# Patient Record
Sex: FEMALE | Race: WHITE
[De-identification: names, ages, dates, MRNs, and addresses within clinical notes are randomized per-mention and may not be internally consistent; named-entity substitution may affect disease eponyms.]

---

## 2017-12-26 ENCOUNTER — HOSPITAL ENCOUNTER (EMERGENCY)
Dept: HOSPITAL 11 - JP.ED | Age: 22
Discharge: HOME | End: 2017-12-26
Payer: MEDICARE

## 2017-12-26 VITALS — DIASTOLIC BLOOD PRESSURE: 97 MMHG | SYSTOLIC BLOOD PRESSURE: 143 MMHG

## 2017-12-26 DIAGNOSIS — F32.9: ICD-10-CM

## 2017-12-26 DIAGNOSIS — F41.9: Primary | ICD-10-CM

## 2017-12-26 DIAGNOSIS — F17.210: ICD-10-CM

## 2017-12-26 DIAGNOSIS — F20.9: ICD-10-CM

## 2017-12-26 DIAGNOSIS — T43.595A: ICD-10-CM

## 2017-12-26 DIAGNOSIS — I10: ICD-10-CM

## 2017-12-26 DIAGNOSIS — Z79.899: ICD-10-CM

## 2017-12-26 NOTE — EDM.PDOCBH
ED HPI GENERAL MEDICAL PROBLEM





- General


Chief Complaint: Behavioral/Psych


Stated Complaint: EVAL


Time Seen by Provider: 12/26/17 20:05


Source of Information: Reports: Patient


History Limitations: Reports: No Limitations





- History of Present Illness


INITIAL COMMENTS - FREE TEXT/NARRATIVE: 





This young woman came in complaining of anxiety. She felt like somebody might 

be trying to get her. She takes Zyprexa but is only taking 10 mg per day. She 

said she was afraid to increase it because it might cause side effects. She 

takes Cogentin it's prescribed 1 mg twice daily but she only takes it once 

daily. Note that she is not having any type of EPS symptoms. There is no 

suicidal or homicidal ideation


  ** Denies


Pain Score (Numeric/FACES): 0





- Related Data


 Allergies











Allergy/AdvReac Type Severity Reaction Status Date / Time


 


No Known Allergies Allergy   Verified 01/30/17 02:46











Home Meds: 


 Home Meds





Benztropine [Cogentin] 1 tab PO BID 09/14/16 [History]


Cholecalciferol (Vitamin D3) [Vitamin D3] 1 tab PO DAILY 09/14/16 [History]


Sertraline [Zoloft] 100 tab PO DAILY 09/14/16 [History]


OLANZapine [Zyprexa] 10 mg PO BEDTIME 12/26/17 [History]











Past Medical History


Cardiovascular History: Reports: Hypertension


Psychiatric History: Reports: Addiction, Anxiety, Depression, Panic Attack, 

Psych Hospitalization(s), Schizophrenia





- Infectious Disease History


Infectious Disease History: Reports: Chicken Pox





- Past Surgical History


HEENT Surgical History: Reports: Oral Surgery





Social & Family History





- Tobacco Use


Smoking Status *Q: Current Every Day Smoker


Years of Tobacco use: 10


Packs/Tins Daily: 0.5


Used Tobacco, but Quit: No


Second Hand Smoke Exposure: Yes





- Caffeine Use


Caffeine Use: Reports: Soda





- Alcohol Use


Days Per Week of Alcohol Use: 0


Number of Drinks Per Day: 2


Total Drinks Per Week: 0





- Recreational Drug Use


Recreational Drug Use: No


Drug Use in Last 12 Months: Yes


Recreational Drug Type: Reports: Amphetamines (Speed), Marijuana/Hashish, 

Methamphetamine


Recreational Drug Use Frequency: Weekly


Recreational Drug Last Use: last night





ED ROS GENERAL





- Review of Systems


Review Of Systems: ROS reveals no pertinent complaints other than HPI.





ED EXAM, BEHAVIORAL HEALTH





- Physical Exam


Exam: See Below


Exam Limited By: No Limitations


General Appearance: Alert, WD/WN, No Apparent Distress


Eye Exam: Bilateral Eye: Normal Inspection


Respiratory/Chest: Lungs Clear


Cardiovascular: Regular Rate, Rhythm


Neurological: Alert, Normal Mood/Affect, CN II-XII Intact, Normal Cognition, 

Normal Gait, No Motor/Sensory Deficits


Psychiatric: Alert, Normal Affect, Normal Cognition, Normal Mood, Other (This 

patient does not appear anxious)





COURSE, BEHAVIORAL HEALTH COMP





- Course


Vital Signs: 


 Last Vital Signs











Temp  36.6 C   12/26/17 20:19


 


Pulse  77   12/26/17 20:19


 


Resp  16   12/26/17 20:19


 


BP  143/97 H  12/26/17 20:19


 


Pulse Ox  94 L  12/26/17 20:19














Departure





- Departure


Time of Disposition: 20:43


Disposition: Home, Self-Care 01


Condition: Fair


Clinical Impression: 


 Anxiety, Medication side effects








- Discharge Information


Instructions:  Panic Attacks


Referrals: 


Himanshu Luz MD [Primary Care Provider] - 


Forms:  ED Department Discharge


Additional Instructions: 


You may increase Zyprexa to 15 mg daily or 1-1/2 tablets. The Cogentin can be 

increased to 1 mg twice daily as needed for side effects. 1 mg twice daily used 

to be the standard dose for counting the side effects of the older generation 

schizophrenia medications. The higher dose of Cogentin may give you a dry 

mouth. Follow-up with a psychiatrist as soon as possible or contact Dr. Luz.

## 2018-08-31 ENCOUNTER — HOSPITAL ENCOUNTER (EMERGENCY)
Dept: HOSPITAL 11 - JP.ED | Age: 23
LOS: 1 days | Discharge: HOME | End: 2018-09-01
Payer: MEDICARE

## 2018-08-31 VITALS — DIASTOLIC BLOOD PRESSURE: 94 MMHG | SYSTOLIC BLOOD PRESSURE: 147 MMHG

## 2018-08-31 DIAGNOSIS — F41.9: Primary | ICD-10-CM

## 2018-08-31 DIAGNOSIS — Z79.899: ICD-10-CM

## 2018-08-31 DIAGNOSIS — F17.210: ICD-10-CM

## 2018-08-31 DIAGNOSIS — I10: ICD-10-CM

## 2018-08-31 PROCEDURE — 85027 COMPLETE CBC AUTOMATED: CPT

## 2018-08-31 PROCEDURE — 36415 COLL VENOUS BLD VENIPUNCTURE: CPT

## 2018-08-31 PROCEDURE — 81025 URINE PREGNANCY TEST: CPT

## 2018-08-31 PROCEDURE — 99283 EMERGENCY DEPT VISIT LOW MDM: CPT

## 2018-08-31 PROCEDURE — 80305 DRUG TEST PRSMV DIR OPT OBS: CPT

## 2018-08-31 PROCEDURE — 81001 URINALYSIS AUTO W/SCOPE: CPT

## 2018-08-31 NOTE — EDM.PDOCBH
ED HPI GENERAL MEDICAL PROBLEM





- General


Chief Complaint: Behavioral/Psych


Stated Complaint: EVAL


Time Seen by Provider: 08/31/18 21:20


Source of Information: Reports: Patient, Old Records, RN


History Limitations: Reports: No Limitations





- History of Present Illness


INITIAL COMMENTS - FREE TEXT/NARRATIVE: 





24 yo female with known mental health issues presents with a family member due 

to being "on the verge of freaking out". Was transferred from here to [facility] about 3 weeks ago. Is not suicidal or homicidal currently. No 

recent illness. 


Onset: Today


Onset Date: 08/31/18


Duration: Hour(s):


Location: Reports: Generalized


Quality: Reports: Burning (throat), Other (anxious)


Severity: Moderate


Improves with: Reports: None


Worsens with: Reports: Other (uncertain)


Context: Reports: Other (Hx of mental health problems)


Associated Symptoms: Reports: Other (throat mild burning, post-nasal drip)


Treatments PTA: Reports: Other (see below) (usual meds only)





- Related Data


 Allergies











Allergy/AdvReac Type Severity Reaction Status Date / Time


 


No Known Allergies Allergy   Verified 08/05/18 20:25











Home Meds: 


 Home Meds





Benztropine [Cogentin] 1 tab PO BID 09/14/16 [History]


OLANZapine [Olanzapine] 1 tab PO DAILY 08/31/18 [History]











Past Medical History


Cardiovascular History: Reports: Hypertension


Psychiatric History: Reports: Addiction, Anxiety, Depression, Panic Attack, 

Psych Hospitalization(s), Schizophrenia





- Infectious Disease History


Infectious Disease History: Reports: Chicken Pox





- Past Surgical History


HEENT Surgical History: Reports: Oral Surgery





Social & Family History





- Family History


Family Medical History: Noncontributory





- Tobacco Use


Smoking Status *Q: Current Every Day Smoker


Years of Tobacco use: 10


Packs/Tins Daily: 0.5





- Caffeine Use


Caffeine Use: Reports: Coffee, Energy Drinks, Soda





- Recreational Drug Use


Recreational Drug Use: No





ED ROS GENERAL





- Review of Systems


Review Of Systems: See Below


Constitutional: Reports: No Symptoms


HEENT: Reports: Throat Pain (mild burning)


Respiratory: Reports: No Symptoms


Cardiovascular: Reports: No Symptoms


GI/Abdominal: Reports: No Symptoms


: Reports: No Symptoms


Musculoskeletal: Reports: No Symptoms


Skin: Reports: No Symptoms


Neurological: Reports: No Symptoms


Psychiatric: Reports: Anxiety





ED EXAM, BEHAVIORAL HEALTH





- Physical Exam


Exam: See Below


Exam Limited By: No Limitations


General Appearance: Alert, WD/WN, No Apparent Distress


Eye Exam: Bilateral Eye: Normal Inspection


Ears: Normal External Exam, Normal Canal, Hearing Grossly Normal, Normal TMs


Nose: Normal Inspection, Normal Mucosa, No Blood


Throat/Mouth: Normal Inspection, Normal Lips, Normal Oropharynx, Normal Voice, 

No Airway Compromise


Head: Atraumatic, Normocephalic


Neck: Normal Inspection, Supple, Non-Tender


Respiratory/Chest: No Respiratory Distress, Lungs Clear, Normal Breath Sounds, 

No Accessory Muscle Use


Cardiovascular: Regular Rate, Rhythm, No Edema


GI/Abdominal: Normal Bowel Sounds, Soft, Non-Tender, No Distention


Extremities: Normal Inspection, Normal Range of Motion, Non-Tender, No Pedal 

Edema


Neurological: Alert, Normal Mood/Affect, CN II-XII Intact, Normal Cognition, 

Normal Reflexes, No Motor/Sensory Deficits, Oriented x 3


Psychiatric: Alert, Normal Affect, Normal Cognition, Normal Mood, Oriented


Skin Exam: Warm, Dry, Intact, Normal color, No rash





COURSE, BEHAVIORAL HEALTH COMP





- Course


Vital Signs: 


 Last Vital Signs











Temp  36.7 C   08/31/18 21:07


 


Pulse  100   08/31/18 21:07


 


Resp  16   08/31/18 21:07


 


BP  147/94 H  08/31/18 21:07


 


Pulse Ox  98   08/31/18 21:07











Orders, Labs, Meds: 


 Active Orders 24 hr











 Category Date Time Status


 


 DRUG SCREEN, URINE [URCHEM] Stat Lab  08/31/18 21:20 Ordered


 


 HCG QUALITATIVE,URINE [URCHEM] Stat Lab  08/31/18 21:20 Ordered


 


 UA W/MICROSCOPIC [URIN] Stat Lab  08/31/18 21:20 Ordered








 Laboratory Tests











  08/31/18 08/31/18 08/31/18 Range/Units





  21:20 21:20 21:20 


 


WBC     (4.5-11.0)  K/uL


 


RBC     (3.30-5.50)  M/uL


 


Hgb     (12.0-15.0)  g/dL


 


Hct     (36.0-48.0)  %


 


MCV     (80-98)  fL


 


MCH     (27-31)  pg


 


MCHC     (32-36)  %


 


Plt Count     (150-400)  K/uL


 


Urine Color  Yellow    


 


Urine Appearance  Clear    


 


Urine pH  6.5    (4.5-8.0)  


 


Ur Specific Gravity  1.015    (1.008-1.030)  


 


Urine Protein  Negative    (NEGATIVE)  mg/dL


 


Urine Glucose (UA)  Normal    (NEGATIVE)  mg/dL


 


Urine Ketones  Negative    (NEGATIVE)  mg/dL


 


Urine Occult Blood  Negative    (NEGATIVE)  


 


Urine Nitrite  Negative    (NEGATIVE)  


 


Urine Bilirubin  Negative    (NEGATIVE)  


 


Urine Urobilinogen  Normal    (NORMAL)  mg/dL


 


Ur Leukocyte Esterase  Negative    (NEGATIVE)  


 


Urine RBC  0-5    (0-5)  


 


Urine WBC  0-5    (0-5)  


 


Ur Epithelial Cells  Many    


 


Amorphous Sediment  Many    


 


Urine Bacteria  Few    


 


Urine Mucus  Not seen    


 


Urine HCG, Qual    Negative  


 


Urine Opiates Screen   Negative   (NEGATIVE)  


 


Ur Oxycodone Screen   Negative   (NEGATIVE)  


 


Urine Methadone Screen   Negative   (NEGATIVE)  


 


Ur Propoxyphene Screen   Negative   (NEGATIVE)  


 


Ur Barbiturates Screen   Negative   (NEGATIVE)  


 


Ur Tricyclics Screen   Negative   (NEGATIVE)  


 


Ur Phencyclidine Scrn   Negative   (NEGATIVE)  


 


Ur Amphetamine Screen   Negative   (NEGATIVE)  


 


U Methamphetamines Scrn   Negative   (NEGATIVE)  


 


Urine MDMA Screen   Negative   (NEGATIVE)  


 


U Benzodiazepines Scrn   Negative   (NEGATIVE)  


 


U Cocaine Metab Screen   Negative   (NEGATIVE)  


 


U Marijuana (THC) Screen   Presumptive positive H   (NEGATIVE)  














  08/31/18 Range/Units





  21:35 


 


WBC  7.7  (4.5-11.0)  K/uL


 


RBC  4.82  (3.30-5.50)  M/uL


 


Hgb  14.5  (12.0-15.0)  g/dL


 


Hct  43.0  (36.0-48.0)  %


 


MCV  89  (80-98)  fL


 


MCH  30  (27-31)  pg


 


MCHC  34  (32-36)  %


 


Plt Count  166  (150-400)  K/uL


 


Urine Color   


 


Urine Appearance   


 


Urine pH   (4.5-8.0)  


 


Ur Specific Gravity   (1.008-1.030)  


 


Urine Protein   (NEGATIVE)  mg/dL


 


Urine Glucose (UA)   (NEGATIVE)  mg/dL


 


Urine Ketones   (NEGATIVE)  mg/dL


 


Urine Occult Blood   (NEGATIVE)  


 


Urine Nitrite   (NEGATIVE)  


 


Urine Bilirubin   (NEGATIVE)  


 


Urine Urobilinogen   (NORMAL)  mg/dL


 


Ur Leukocyte Esterase   (NEGATIVE)  


 


Urine RBC   (0-5)  


 


Urine WBC   (0-5)  


 


Ur Epithelial Cells   


 


Amorphous Sediment   


 


Urine Bacteria   


 


Urine Mucus   


 


Urine HCG, Qual   


 


Urine Opiates Screen   (NEGATIVE)  


 


Ur Oxycodone Screen   (NEGATIVE)  


 


Urine Methadone Screen   (NEGATIVE)  


 


Ur Propoxyphene Screen   (NEGATIVE)  


 


Ur Barbiturates Screen   (NEGATIVE)  


 


Ur Tricyclics Screen   (NEGATIVE)  


 


Ur Phencyclidine Scrn   (NEGATIVE)  


 


Ur Amphetamine Screen   (NEGATIVE)  


 


U Methamphetamines Scrn   (NEGATIVE)  


 


Urine MDMA Screen   (NEGATIVE)  


 


U Benzodiazepines Scrn   (NEGATIVE)  


 


U Cocaine Metab Screen   (NEGATIVE)  


 


U Marijuana (THC) Screen   (NEGATIVE)  








Medications














Discontinued Medications














Generic Name Dose Route Start Last Admin





  Trade Name Raymond  PRN Reason Stop Dose Admin


 


Al Hydroxide/Mg Hydroxide  30 ml  08/31/18 21:24  08/31/18 21:33





  Mag-Al Plus  PO  08/31/18 21:25  30 ml





  ONETIME ONE   Administration





     





     





     





     


 


Lorazepam  1 mg  08/31/18 21:24  08/31/18 22:01





  Ativan  PO  08/31/18 21:25  Not Given





  ONETIME ONE   





     





     





     





     











Re-Assessment/Re-Exam: 





After several hours in the ER Maria A is feeling better, says she now thinks she 

was having a mild panic attack that has passed. Mother is OK with taking her 

home. Will follow up with her psych provider in [place] on Tues. 





Departure





- Departure


Time of Disposition: 03:43


Disposition: Home, Self-Care 01


Condition: Good


Clinical Impression: 


 Anxiety








- Discharge Information


*PRESCRIPTION DRUG MONITORING PROGRAM REVIEWED*: Not Applicable


*COPY OF PRESCRIPTION DRUG MONITORING REPORT IN PATIENT ARPAN: Not Applicable


Referrals: 


PCP,None [Primary Care Provider] - 


Forms:  ED Department Discharge


Additional Instructions: 


Continue your current meds. F/U with your mental health provider early next 

week if possible. 





- My Orders


Last 24 Hours: 


My Active Orders





08/31/18 21:20


DRUG SCREEN, URINE [URCHEM] Stat 


HCG QUALITATIVE,URINE [URCHEM] Stat 


UA W/MICROSCOPIC [URIN] Stat 














- Assessment/Plan


Last 24 Hours: 


My Active Orders





08/31/18 21:20


DRUG SCREEN, URINE [URCHEM] Stat 


HCG QUALITATIVE,URINE [URCHEM] Stat 


UA W/MICROSCOPIC [URIN] Stat

## 2018-10-03 ENCOUNTER — HOSPITAL ENCOUNTER (EMERGENCY)
Dept: HOSPITAL 11 - JP.ED | Age: 23
Discharge: HOME | End: 2018-10-03
Payer: MEDICARE

## 2018-10-03 VITALS — DIASTOLIC BLOOD PRESSURE: 113 MMHG | SYSTOLIC BLOOD PRESSURE: 166 MMHG

## 2018-10-03 DIAGNOSIS — I10: ICD-10-CM

## 2018-10-03 DIAGNOSIS — F41.9: ICD-10-CM

## 2018-10-03 DIAGNOSIS — K12.0: Primary | ICD-10-CM

## 2018-10-03 DIAGNOSIS — Z79.899: ICD-10-CM

## 2018-10-03 DIAGNOSIS — F17.210: ICD-10-CM

## 2018-10-03 DIAGNOSIS — F32.9: ICD-10-CM

## 2018-10-03 NOTE — EDM.PDOC
ED HPI GENERAL MEDICAL PROBLEM





- General


Chief Complaint: ENT Problem


Stated Complaint: SORES IN MOUTH


Time Seen by Provider: 10/03/18 12:30


Source of Information: Reports: Patient


History Limitations: Reports: No Limitations





- History of Present Illness


INITIAL COMMENTS - FREE TEXT/NARRATIVE: 





23-year-old female who has some sores on her left lower lip for the past 2 

days. No sore throat or fever.


Onset: Gradual (Over the past 2 days)


  ** Oral/Mouth


Pain Score (Numeric/FACES): 6





- Related Data


 Allergies











Allergy/AdvReac Type Severity Reaction Status Date / Time


 


No Known Allergies Allergy   Verified 10/03/18 12:14











Home Meds: 


 Home Meds





risperiDONE 1 tab PO BEDTIME 10/03/18 [History]











Past Medical History


Cardiovascular History: Reports: Hypertension


Psychiatric History: Reports: Addiction, Anxiety, Depression, Panic Attack, 

Psych Hospitalization(s), Schizophrenia





- Infectious Disease History


Infectious Disease History: Reports: Chicken Pox





- Past Surgical History


HEENT Surgical History: Reports: Oral Surgery





Social & Family History





- Family History


Family Medical History: Noncontributory





- Tobacco Use


Smoking Status *Q: Light Tobacco Smoker


Years of Tobacco use: 10


Packs/Tins Daily: 1





- Caffeine Use


Caffeine Use: Reports: Coffee, Energy Drinks, Soda





- Alcohol Use


Days Per Week of Alcohol Use: 1


Number of Drinks Per Day: 3


Total Drinks Per Week: 3





- Recreational Drug Use


Recreational Drug Use: Yes


Recreational Drug Type: Reports: Marijuana/Hashish, Methamphetamine





ED ROS ENT





- Review of Systems


Review Of Systems: See Below


Constitutional: Denies: Fever, Chills


Respiratory: Denies: Shortness of Breath


GI/Abdominal: Denies: Nausea, Vomiting


Psychiatric: Reports: Anxiety





ED EXAM, ENT





- Physical Exam


Exam: See Below


Exam Limited By: No Limitations


General Appearance: Alert, No Apparent Distress, Anxious


Mouth/Throat: Other (Patient does have some aphthous ulcers in the crease of 

the left lower gumline but no significant gingivitis or facial swelling)


Respiratory/Chest: No Respiratory Distress





Course





- Vital Signs


Last Recorded V/S: 


 Last Vital Signs











Temp  96.2 F   10/03/18 12:13


 


Pulse  151 H  10/03/18 12:13


 


Resp  16   10/03/18 12:13


 


BP  166/113 H  10/03/18 12:13


 


Pulse Ox  99   10/03/18 12:13














- Re-Assessments/Exams


Free Text/Narrative Re-Assessment/Exam: 





10/03/18 12:43


Patient will be placed on some triamcinolone oral paste twice daily, and can 

recheck if not improving satisfactorily.





Departure





- Departure


Time of Disposition: 13:01


Disposition: Home, Self-Care 01


Condition: Good


Clinical Impression: 


 Aphthous ulcer of mouth








- Discharge Information


Instructions:  Canker Sores


Referrals: 


Himanshu Luz MD [Primary Care Provider] - 


Forms:  ED Department Discharge


Care Plan Goals: 


Use steroid medicine on top of your sores twice daily until they are improved. 

Recheck in 3-4 days if not improving satisfactorily.

## 2019-01-15 ENCOUNTER — HOSPITAL ENCOUNTER (EMERGENCY)
Dept: HOSPITAL 11 - JP.ED | Age: 24
Discharge: LEFT BEFORE DISCHARGE | End: 2019-01-15
Payer: MEDICARE

## 2019-01-15 DIAGNOSIS — Z53.21: Primary | ICD-10-CM

## 2019-04-08 ENCOUNTER — HOSPITAL ENCOUNTER (EMERGENCY)
Dept: HOSPITAL 11 - JP.ED | Age: 24
Discharge: PSYCHIATRIC OR SUBSTANCE USE FACILITY | End: 2019-04-08
Payer: MEDICARE

## 2019-04-08 VITALS — SYSTOLIC BLOOD PRESSURE: 124 MMHG | DIASTOLIC BLOOD PRESSURE: 83 MMHG

## 2019-04-08 DIAGNOSIS — F15.10: ICD-10-CM

## 2019-04-08 DIAGNOSIS — Z88.8: ICD-10-CM

## 2019-04-08 DIAGNOSIS — F17.210: ICD-10-CM

## 2019-04-08 DIAGNOSIS — F31.9: Primary | ICD-10-CM

## 2019-04-08 DIAGNOSIS — I10: ICD-10-CM

## 2019-04-08 PROCEDURE — 81001 URINALYSIS AUTO W/SCOPE: CPT

## 2019-04-08 PROCEDURE — 85025 COMPLETE CBC W/AUTO DIFF WBC: CPT

## 2019-04-08 PROCEDURE — 84702 CHORIONIC GONADOTROPIN TEST: CPT

## 2019-04-08 PROCEDURE — 36415 COLL VENOUS BLD VENIPUNCTURE: CPT

## 2019-04-08 PROCEDURE — 99285 EMERGENCY DEPT VISIT HI MDM: CPT

## 2019-04-08 PROCEDURE — G0480 DRUG TEST DEF 1-7 CLASSES: HCPCS

## 2019-04-08 PROCEDURE — 80305 DRUG TEST PRSMV DIR OPT OBS: CPT

## 2019-04-08 PROCEDURE — 80053 COMPREHEN METABOLIC PANEL: CPT

## 2019-04-08 NOTE — EDM.PDOCBH
ED HPI GENERAL MEDICAL PROBLEM





- General


Chief Complaint: Behavioral/Psych


Stated Complaint: EVAL


Time Seen by Provider: 04/08/19 15:30


Source of Information: Reports: Patient


History Limitations: Reports: No Limitations





- History of Present Illness


INITIAL COMMENTS - FREE TEXT/NARRATIVE: 





pt arrived to placed in a inpatient setting.  She has been out of control at 

home. She is in the process of a civil commitment. She has been using Meth on a 

regular basis. A pipe and 2 empty bagies were found. She has been going through 

[facility] AND THEY ARE REQUESTING THAT SHE BE PLACED WHERE SHE CAN BE KEPT 

SAFE. sHE IS NOT TAKING HER NITE TIME MEDS.  sHE DOES RECEIVE THE AMBILIFY IN 

THE im FORM.  


Onset: Gradual


Duration: Day(s):


Location: Reports: Other (PT HAS BEEN SMOKING mETH. )


Associated Symptoms: Reports: No Other Symptoms





- Related Data


 Allergies











Allergy/AdvReac Type Severity Reaction Status Date / Time


 


risperidone [From Risperdal] Allergy  Delusions Verified 01/30/19 17:31











Home Meds: 


 Home Meds





ARIPiprazole [Aripiprazole] 20 mg PO BEDTIME 12/05/18 [History]


Benztropine Mesylate 1 mg PO BEDTIME 12/05/18 [History]


traZODone HCl [Trazodone HCl] 50 mg PO BEDTIME 12/05/18 [History]


Benztropine Mesylate 1 tab PO ONETIME PRN 01/30/19 [History]











Past Medical History


HEENT History: Reports: None


Cardiovascular History: Reports: Hypertension


OB/GYN History: Reports: Pregnancy


Psychiatric History: Reports: Addiction, Anxiety, Bipolar, Panic Attack, Psych 

Hospitalization(s), Schizophrenia





- Infectious Disease History


Infectious Disease History: Reports: Chicken Pox, Measles, Mumps





- Past Surgical History


Head Surgeries/Procedures: Reports: None


HEENT Surgical History: Reports: Oral Surgery


Respiratory Surgical History: Reports: None


Dermatological Surgical History: Reports: None





Social & Family History





- Family History


Family Medical History: Noncontributory





- Tobacco Use


Smoking Status *Q: Current Every Day Smoker


Years of Tobacco use: 10


Packs/Tins Daily: 0.5





- Caffeine Use


Caffeine Use: Reports: Coffee, Energy Drinks, Soda





- Recreational Drug Use


Recreational Drug Use: Yes


Drug Use in Last 12 Months: Yes


Recreational Drug Type: Reports: Marijuana/Hashish, Methamphetamine


Recreational Drug Use Frequency: Weekly





ED ROS GENERAL





- Review of Systems


Review Of Systems: See Below


Constitutional: Reports: No Symptoms


HEENT: Reports: No Symptoms


Respiratory: Reports: No Symptoms


Cardiovascular: Reports: No Symptoms


Endocrine: Reports: No Symptoms


GI/Abdominal: Reports: No Symptoms


: Reports: No Symptoms


Musculoskeletal: Reports: No Symptoms


Skin: Reports: No Symptoms


Psychiatric: Reports: Agitation, Anxiety, Hallucinations (PT HAS VARIOUS 

SYMPTOMS FROM HER USAGE. sHE HAS NOT SLEPT FOR A NUMBER OF DAYS. ), Other





ED EXAM, BEHAVIORAL HEALTH





- Physical Exam


Exam: See Below


Text/Narrative:: 





pT ARRIVED WITH  A HISTORY OF USING mETH REGULARLY AND BEING QUITE OUT OF 

CONTROL SHE IS NOT TAKING HER USUAL MEDS. -- AT [facility]ITE. 


Exam Limited By: No Limitations


General Appearance: Alert, Other (PT IS SLEEP DEPRIVED. )


Ears: Normal TMs


Nose: Normal Inspection


Throat/Mouth: Normal Inspection, Inflammation


Neck: Normal Inspection


Respiratory/Chest: No Respiratory Distress


Cardiovascular: Regular Rate, Rhythm


GI/Abdominal: Soft, Non-Tender


Rectal (Female) Exam: Deferred


Back Exam: Normal Inspection


Extremities: Normal Inspection


Neurological: Alert


Psychiatric: Alert, Other (PT IS USING METH ON A REGULAR BASIS. )





COURSE, BEHAVIORAL HEALTH COMP





- Course


Vital Signs: 


 Last Vital Signs











Temp  35.3 C   04/08/19 18:20


 


Pulse  89   04/08/19 18:20


 


Resp  16   04/08/19 18:20


 


BP  124/83   04/08/19 18:20


 


Pulse Ox  98   04/08/19 18:20











Orders, Labs, Meds: 


 Laboratory Tests











  04/08/19 04/08/19 04/08/19 Range/Units





  15:32 15:32 15:39 


 


WBC    8.0  (4.5-11.0)  K/uL


 


RBC    4.96  (3.30-5.50)  M/uL


 


Hgb    14.8  (12.0-15.0)  g/dL


 


Hct    43.1  (36.0-48.0)  %


 


MCV    87  (80-98)  fL


 


MCH    30  (27-31)  pg


 


MCHC    34  (32-36)  %


 


Plt Count    205  (150-400)  K/uL


 


Neut % (Auto)    53  (36-66)  %


 


Lymph % (Auto)    33  (24-44)  %


 


Mono % (Auto)    13 H  (2-6)  %


 


Eos % (Auto)    1 L  (2-4)  %


 


Baso % (Auto)    0  (0-1)  %


 


Sodium     (140-148)  mmol/L


 


Potassium     (3.6-5.2)  mmol/L


 


Chloride     (100-108)  mmol/L


 


Carbon Dioxide     (21-32)  mmol/L


 


Anion Gap     (5.0-14.0)  mmol/L


 


BUN     (7-18)  mg/dL


 


Creatinine     (0.6-1.0)  mg/dL


 


Est Cr Clr Drug Dosing     mL/min


 


Estimated GFR (MDRD)     (>60)  


 


Glucose     ()  mg/dL


 


Calcium     (8.5-10.1)  mg/dL


 


Total Bilirubin     (0.2-1.0)  mg/dL


 


AST     (15-37)  U/L


 


ALT     (12-78)  U/L


 


Alkaline Phosphatase     ()  U/L


 


Total Protein     (6.4-8.2)  g/dL


 


Albumin     (3.4-5.0)  g/dL


 


Globulin     (2.3-3.5)  g/dL


 


Albumin/Globulin Ratio     (1.2-2.2)  


 


HCG, Quant     (0-6)  mIU/mL


 


Urine Color  Yellow    


 


Urine Appearance  Clear    


 


Urine pH  5.0    (4.5-8.0)  


 


Ur Specific Gravity  1.020    (1.008-1.030)  


 


Urine Protein  Negative    (NEGATIVE)  mg/dL


 


Urine Glucose (UA)  50 H    (NEGATIVE)  mg/dL


 


Urine Ketones  Negative    (NEGATIVE)  mg/dL


 


Urine Occult Blood  Negative    (NEGATIVE)  


 


Urine Nitrite  Negative    (NEGATIVE)  


 


Urine Bilirubin  Negative    (NEGATIVE)  


 


Urine Urobilinogen  Normal    (NORMAL)  mg/dL


 


Ur Leukocyte Esterase  Negative    (NEGATIVE)  


 


Urine RBC  0-5    (0-5)  


 


Urine WBC  0-5    (0-5)  


 


Ur Epithelial Cells  Few    


 


Amorphous Sediment  Few    


 


Urine Bacteria  Not seen    


 


Urine Mucus  Not seen    


 


Urine Opiates Screen   Negative   (NEGATIVE)  


 


Ur Oxycodone Screen   Negative   (NEGATIVE)  


 


Urine Methadone Screen   Negative   (NEGATIVE)  


 


Ur Propoxyphene Screen   Negative   (NEGATIVE)  


 


Ur Barbiturates Screen   Negative   (NEGATIVE)  


 


Ur Tricyclics Screen   Negative   (NEGATIVE)  


 


Ur Phencyclidine Scrn   Negative   (NEGATIVE)  


 


Ur Amphetamine Screen   Presumptive positive H   (NEGATIVE)  


 


U Methamphetamines Scrn   Presumptive positive H   (NEGATIVE)  


 


Urine MDMA Screen   Negative   (NEGATIVE)  


 


U Benzodiazepines Scrn   Negative   (NEGATIVE)  


 


U Cocaine Metab Screen   Negative   (NEGATIVE)  


 


U Marijuana (THC) Screen   Presumptive positive H   (NEGATIVE)  


 


Ethyl Alcohol     mg/dL














  04/08/19 04/08/19 04/08/19 Range/Units





  15:39 15:39 16:17 


 


WBC     (4.5-11.0)  K/uL


 


RBC     (3.30-5.50)  M/uL


 


Hgb     (12.0-15.0)  g/dL


 


Hct     (36.0-48.0)  %


 


MCV     (80-98)  fL


 


MCH     (27-31)  pg


 


MCHC     (32-36)  %


 


Plt Count     (150-400)  K/uL


 


Neut % (Auto)     (36-66)  %


 


Lymph % (Auto)     (24-44)  %


 


Mono % (Auto)     (2-6)  %


 


Eos % (Auto)     (2-4)  %


 


Baso % (Auto)     (0-1)  %


 


Sodium  139 L    (140-148)  mmol/L


 


Potassium  3.9    (3.6-5.2)  mmol/L


 


Chloride  102    (100-108)  mmol/L


 


Carbon Dioxide  27    (21-32)  mmol/L


 


Anion Gap  13.9    (5.0-14.0)  mmol/L


 


BUN  14    (7-18)  mg/dL


 


Creatinine  1.0    (0.6-1.0)  mg/dL


 


Est Cr Clr Drug Dosing  62.31    mL/min


 


Estimated GFR (MDRD)  > 60    (>60)  


 


Glucose  68 L    ()  mg/dL


 


Calcium  9.3    (8.5-10.1)  mg/dL


 


Total Bilirubin  0.8    (0.2-1.0)  mg/dL


 


AST  9 L    (15-37)  U/L


 


ALT  12    (12-78)  U/L


 


Alkaline Phosphatase  88    ()  U/L


 


Total Protein  7.7    (6.4-8.2)  g/dL


 


Albumin  4.1    (3.4-5.0)  g/dL


 


Globulin  3.6 H    (2.3-3.5)  g/dL


 


Albumin/Globulin Ratio  1.1 L    (1.2-2.2)  


 


HCG, Quant    0  (0-6)  mIU/mL


 


Urine Color     


 


Urine Appearance     


 


Urine pH     (4.5-8.0)  


 


Ur Specific Gravity     (1.008-1.030)  


 


Urine Protein     (NEGATIVE)  mg/dL


 


Urine Glucose (UA)     (NEGATIVE)  mg/dL


 


Urine Ketones     (NEGATIVE)  mg/dL


 


Urine Occult Blood     (NEGATIVE)  


 


Urine Nitrite     (NEGATIVE)  


 


Urine Bilirubin     (NEGATIVE)  


 


Urine Urobilinogen     (NORMAL)  mg/dL


 


Ur Leukocyte Esterase     (NEGATIVE)  


 


Urine RBC     (0-5)  


 


Urine WBC     (0-5)  


 


Ur Epithelial Cells     


 


Amorphous Sediment     


 


Urine Bacteria     


 


Urine Mucus     


 


Urine Opiates Screen     (NEGATIVE)  


 


Ur Oxycodone Screen     (NEGATIVE)  


 


Urine Methadone Screen     (NEGATIVE)  


 


Ur Propoxyphene Screen     (NEGATIVE)  


 


Ur Barbiturates Screen     (NEGATIVE)  


 


Ur Tricyclics Screen     (NEGATIVE)  


 


Ur Phencyclidine Scrn     (NEGATIVE)  


 


Ur Amphetamine Screen     (NEGATIVE)  


 


U Methamphetamines Scrn     (NEGATIVE)  


 


Urine MDMA Screen     (NEGATIVE)  


 


U Benzodiazepines Scrn     (NEGATIVE)  


 


U Cocaine Metab Screen     (NEGATIVE)  


 


U Marijuana (THC) Screen     (NEGATIVE)  


 


Ethyl Alcohol   < 3   mg/dL








Medications














Discontinued Medications














Generic Name Dose Route Start Last Admin





  Trade Name Freq  PRN Reason Stop Dose Admin


 


Lorazepam  1 mg  04/08/19 18:02  04/08/19 18:07





  Ativan  PO  04/08/19 18:03  1 mg





  ONETIME ONE   Administration





     





     





     





     











Medical Clearance: 





04/08/19 16:25


DRUG SCREEN IS POSITIVE FOR METH AND FOR [facility]. hER LAB WORK OTHERWISE 

LOOKS GOOD.   


04/08/19 16:26





04/08/19 18:03


 pt was accepted at [facility]. . She was given ativan 1 mg prior to leaving. 


04/09/19 07:20








Departure





- Departure


Time of Disposition: 18:04


Disposition: DC/Tfer to Psych Hosp[facility]


Condition: Fair


Clinical Impression: 


 Bipolar 1 disorder, Methamphetamine abuse








- Discharge Information


Referrals: 


Himanshu Luz MD [Primary Care Provider] - 


Forms:  ED Department Discharge


Care Plan Goals: 


 Transfer to [facility] in [place].

## 2019-08-14 ENCOUNTER — HOSPITAL ENCOUNTER (EMERGENCY)
Dept: HOSPITAL 11 - JP.ED | Age: 24
Discharge: HOME | End: 2019-08-14
Payer: MEDICARE

## 2019-08-14 VITALS — SYSTOLIC BLOOD PRESSURE: 137 MMHG | DIASTOLIC BLOOD PRESSURE: 89 MMHG

## 2019-08-14 VITALS — HEART RATE: 122 BPM

## 2019-08-14 DIAGNOSIS — Z79.899: ICD-10-CM

## 2019-08-14 DIAGNOSIS — R00.0: Primary | ICD-10-CM

## 2019-08-14 DIAGNOSIS — I10: ICD-10-CM

## 2019-08-14 DIAGNOSIS — F41.9: ICD-10-CM

## 2019-08-14 DIAGNOSIS — Z88.8: ICD-10-CM

## 2019-08-14 DIAGNOSIS — F17.210: ICD-10-CM

## 2019-08-14 PROCEDURE — 80048 BASIC METABOLIC PNL TOTAL CA: CPT

## 2019-08-14 PROCEDURE — 81001 URINALYSIS AUTO W/SCOPE: CPT

## 2019-08-14 PROCEDURE — 99284 EMERGENCY DEPT VISIT MOD MDM: CPT

## 2019-08-14 PROCEDURE — 36415 COLL VENOUS BLD VENIPUNCTURE: CPT

## 2019-08-14 PROCEDURE — 80305 DRUG TEST PRSMV DIR OPT OBS: CPT

## 2019-08-14 PROCEDURE — 85027 COMPLETE CBC AUTOMATED: CPT

## 2019-08-14 PROCEDURE — 84443 ASSAY THYROID STIM HORMONE: CPT

## 2019-08-14 NOTE — EDM.PDOC
ED HPI GENERAL MEDICAL PROBLEM





- General


Chief Complaint: Cardiovascular Problem


Stated Complaint: CHECKING PULSE HIGH


Time Seen by Provider: 08/14/19 17:30


Source of Information: Reports: Patient, Old Records, RN


History Limitations: Reports: No Limitations





- History of Present Illness


INITIAL COMMENTS - FREE TEXT/NARRATIVE: 





25 yo female was in the [facility] today for a sore throat and tachycardia. 

An antibiotic was prescribed and blood was drawn to be sent out. They then told 

Maria A to come to the ER. We do not know what tests they intended to run. 





The tachycardia has apparently been going on for about a month. There is no 

fever, diarrhea, melena, diarrhea, vomiting, or chest pains. Maria A has been 

recently hospitalized in a psychiatric facility and had a lot of blood work 

there and apparently had intermittent tachycardia while there. It is not clear 

what was done about it. 





There is a remote hx of methamphetamine use. 


Onset: Unknown/Unsure


Duration: Week(s):, Constant


Location: Reports: Chest


Quality: Reports: Other (no pain with her tachycardia)


Severity: Moderate


Improves with: Reports: None


Worsens with: Reports: None


Context: Reports: Other (See above, did have a "Monster Drink" this morning. )


Associated Symptoms: Reports: No Other Symptoms


Treatments PTA: Reports: Other (see below) (none)





- Related Data


 Allergies











Allergy/AdvReac Type Severity Reaction Status Date / Time


 


risperidone [From Risperdal] AdvReac  Delusions Verified 08/14/19 17:20











Home Meds: 


 Home Meds





Benztropine Mesylate 2 mg PO BEDTIME 12/05/18 [History]


Benztropine Mesylate 1 tab PO ONETIME PRN 01/30/19 [History]


Haloperidol Decanoate  08/14/19 [History]


LORazepam  08/14/19 [History]


Metoprolol Succinate 100 mg PO BEDTIME #14 tab.er.24h 08/14/19 [Rx]


cloZAPine  08/14/19 [History]











Past Medical History


HEENT History: Reports: None


Cardiovascular History: Reports: Hypertension


OB/GYN History: Reports: Pregnancy


Psychiatric History: Reports: Addiction, Anxiety, Bipolar, Panic Attack, Psych 

Hospitalization(s), Schizophrenia





- Infectious Disease History


Infectious Disease History: Reports: Chicken Pox, Measles, Mumps





- Past Surgical History


Head Surgeries/Procedures: Reports: None


HEENT Surgical History: Reports: Oral Surgery


Respiratory Surgical History: Reports: None


Dermatological Surgical History: Reports: None





Social & Family History





- Family History


Family Medical History: Noncontributory





- Tobacco Use


Smoking Status *Q: Current Every Day Smoker


Years of Tobacco use: 6


Packs/Tins Daily: 0.5





- Caffeine Use


Caffeine Use: Reports: Coffee, Energy Drinks, Soda





ED ROS GENERAL





- Review of Systems


Review Of Systems: See Below


Constitutional: Reports: No Symptoms


HEENT: Reports: No Symptoms


Respiratory: Reports: No Symptoms


Cardiovascular: Reports: Other (tachycardia)


GI/Abdominal: Reports: No Symptoms


: Reports: No Symptoms


Musculoskeletal: Reports: No Symptoms


Skin: Reports: No Symptoms


Neurological: Reports: No Symptoms





ED EXAM, GENERAL





- Physical Exam


Exam: See Below


Exam Limited By: No Limitations


General Appearance: Alert, WD/WN, No Apparent Distress


Eye Exam: Bilateral Eye: Normal Inspection


Ears: Normal External Exam, Normal Canal, Hearing Grossly Normal, Normal TMs


Ear Exam: Bilateral Ear: Auricle Normal, Canal Normal, TM normal


Nose: Normal Inspection, No Blood


Throat/Mouth: Normal Inspection, Normal Lips, Normal Oropharynx, Normal Voice, 

No Airway Compromise


Head: Atraumatic, Normocephalic


Neck: Normal Inspection


Respiratory/Chest: No Respiratory Distress, Lungs Clear, Normal Breath Sounds, 

No Accessory Muscle Use


Cardiovascular: Regular Rate, Rhythm, No Edema, Tachycardia


GI/Abdominal: Normal Bowel Sounds, Soft, Non-Tender, No Distention


Back Exam: Normal Inspection.  No: CVA Tenderness (R), CVA Tenderness (L)


Extremities: Normal Inspection, Normal Range of Motion, Non-Tender, No Pedal 

Edema


Neurological: Alert, Oriented, CN II-XII Intact, Normal Cognition, No Motor/

Sensory Deficits


Psychiatric: Normal Affect, Normal Mood


Skin Exam: Warm, Dry, Intact, Normal Color, No Rash





Course





- Vital Signs


Text/Narrative:: 





Orthostats normal


Last Recorded V/S: 


 Last Vital Signs











Temp  36.8 C   08/14/19 17:30


 


Pulse  122 H  08/14/19 18:23


 


Resp  15   08/14/19 18:18


 


BP  137/89   08/14/19 18:23


 


Pulse Ox  99   08/14/19 18:18








 





Orthostatic Blood Pressure [     130/88


Standing]                        


Orthostatic Blood Pressure [     132/90


Sitting]                         


Orthostatic Blood Pressure [     129/86


Supine]                          











- Orders/Labs/Meds


Orders: 


 Active Orders 24 hr











 Category Date Time Status


 


 Cardiac Monitoring [RC] .As Directed Care  08/14/19 17:33 Active


 


 Orthostatic Vital Signs [RC] ASDIRECTED Care  08/14/19 17:33 Active











Labs: 


 Laboratory Tests











  08/14/19 08/14/19 08/14/19 Range/Units





  17:51 17:54 17:56 


 


WBC     (4.5-11.0)  K/uL


 


RBC     (3.30-5.50)  M/uL


 


Hgb     (12.0-15.0)  g/dL


 


Hct     (36.0-48.0)  %


 


MCV     (80-98)  fL


 


MCH     (27-31)  pg


 


MCHC     (32-36)  %


 


Plt Count     (150-400)  K/uL


 


Sodium    141  (140-148)  mmol/L


 


Potassium    3.9  (3.6-5.2)  mmol/L


 


Chloride    104  (100-108)  mmol/L


 


Carbon Dioxide    25  (21-32)  mmol/L


 


Anion Gap    12.5  (5.0-14.0)  mmol/L


 


BUN    9  (7-18)  mg/dL


 


Creatinine    0.9  (0.6-1.0)  mg/dL


 


Est Cr Clr Drug Dosing    96.63  mL/min


 


Estimated GFR (MDRD)    > 60  (>60)  


 


Glucose    105  ()  mg/dL


 


Calcium    9.0  (8.5-10.1)  mg/dL


 


TSH, Ultra Sensitive     (0.358-3.740)  uIU/mL


 


Urine Color  Yellow    (YELLOW)  


 


Urine Appearance  Slightly cloudy A    (CLEAR)  


 


Urine pH  7.0    (5.0-8.0)  


 


Ur Specific Gravity  1.020    (1.008-1.030)  


 


Urine Protein  Negative    (NEGATIVE)  mg/dL


 


Urine Glucose (UA)  Normal    (NEGATIVE)  mg/dL


 


Urine Ketones  Negative    (NEGATIVE)  mg/dL


 


Urine Occult Blood  Moderate    (NEGATIVE)  


 


Urine Nitrite  Negative    (NEGATIVE)  


 


Urine Bilirubin  Negative    (NEGATIVE)  


 


Urine Urobilinogen  Normal    (0.2-1.0)  EU/dL


 


Ur Leukocyte Esterase  Negative    (NEGATIVE)  


 


Urine RBC  5-10 H    (0-5)  


 


Urine WBC  Not seen    (0-5)  


 


Ur Epithelial Cells  Not seen    


 


Amorphous Sediment  Many    


 


Urine Bacteria  Not seen    


 


Urine Mucus  Numerous    


 


Urine Opiates Screen   Negative   (NEGATIVE)  


 


Ur Oxycodone Screen   Negative   (NEGATIVE)  


 


Urine Methadone Screen   Negative   (NEGATIVE)  


 


Ur Propoxyphene Screen   Negative   (NEGATIVE)  


 


Ur Barbiturates Screen   Negative   (NEGATIVE)  


 


Ur Tricyclics Screen   Negative   (NEGATIVE)  


 


Ur Phencyclidine Scrn   Negative   (NEGATIVE)  


 


Ur Amphetamine Screen   Negative   (NEGATIVE)  


 


U Methamphetamines Scrn   Negative   (NEGATIVE)  


 


Urine MDMA Screen   Negative   (NEGATIVE)  


 


U Benzodiazepines Scrn   Negative   (NEGATIVE)  


 


U Cocaine Metab Screen   Negative   (NEGATIVE)  


 


U Marijuana (THC) Screen   Negative   (NEGATIVE)  














  08/14/19 08/14/19 Range/Units





  17:56 17:57 


 


WBC  6.7   (4.5-11.0)  K/uL


 


RBC  4.62   (3.30-5.50)  M/uL


 


Hgb  13.3   (12.0-15.0)  g/dL


 


Hct  41.2   (36.0-48.0)  %


 


MCV  89   (80-98)  fL


 


MCH  29   (27-31)  pg


 


MCHC  32   (32-36)  %


 


Plt Count  186   (150-400)  K/uL


 


Sodium    (140-148)  mmol/L


 


Potassium    (3.6-5.2)  mmol/L


 


Chloride    (100-108)  mmol/L


 


Carbon Dioxide    (21-32)  mmol/L


 


Anion Gap    (5.0-14.0)  mmol/L


 


BUN    (7-18)  mg/dL


 


Creatinine    (0.6-1.0)  mg/dL


 


Est Cr Clr Drug Dosing    mL/min


 


Estimated GFR (MDRD)    (>60)  


 


Glucose    ()  mg/dL


 


Calcium    (8.5-10.1)  mg/dL


 


TSH, Ultra Sensitive   0.625  (0.358-3.740)  uIU/mL


 


Urine Color    (YELLOW)  


 


Urine Appearance    (CLEAR)  


 


Urine pH    (5.0-8.0)  


 


Ur Specific Gravity    (1.008-1.030)  


 


Urine Protein    (NEGATIVE)  mg/dL


 


Urine Glucose (UA)    (NEGATIVE)  mg/dL


 


Urine Ketones    (NEGATIVE)  mg/dL


 


Urine Occult Blood    (NEGATIVE)  


 


Urine Nitrite    (NEGATIVE)  


 


Urine Bilirubin    (NEGATIVE)  


 


Urine Urobilinogen    (0.2-1.0)  EU/dL


 


Ur Leukocyte Esterase    (NEGATIVE)  


 


Urine RBC    (0-5)  


 


Urine WBC    (0-5)  


 


Ur Epithelial Cells    


 


Amorphous Sediment    


 


Urine Bacteria    


 


Urine Mucus    


 


Urine Opiates Screen    (NEGATIVE)  


 


Ur Oxycodone Screen    (NEGATIVE)  


 


Urine Methadone Screen    (NEGATIVE)  


 


Ur Propoxyphene Screen    (NEGATIVE)  


 


Ur Barbiturates Screen    (NEGATIVE)  


 


Ur Tricyclics Screen    (NEGATIVE)  


 


Ur Phencyclidine Scrn    (NEGATIVE)  


 


Ur Amphetamine Screen    (NEGATIVE)  


 


U Methamphetamines Scrn    (NEGATIVE)  


 


Urine MDMA Screen    (NEGATIVE)  


 


U Benzodiazepines Scrn    (NEGATIVE)  


 


U Cocaine Metab Screen    (NEGATIVE)  


 


U Marijuana (THC) Screen    (NEGATIVE)  











Meds: 


Medications














Discontinued Medications














Generic Name Dose Route Start Last Admin





  Trade Name Freq  PRN Reason Stop Dose Admin


 


Metoprolol Tartrate  25 mg  08/14/19 18:18  08/14/19 18:23





  Lopressor  PO  08/14/19 18:19  25 mg





  ONETIME ONE   Administration





     





     





     





     














Departure





- Departure


Time of Disposition: 19:00


Disposition: Home, Self-Care 01


Condition: Fair


Clinical Impression: 


 Tachycardia





Prescriptions: 


Metoprolol Succinate 100 mg PO BEDTIME #14 tab.er.24h


Instructions:  Sinus Tachycardia


Referrals: 


Camille Fan PA-C [Primary Care Provider] - 


Forms:  ED Department Discharge


Additional Instructions: 


Take the metoprolol at bedtime daily. Recheck in the clinic within the week. 

Return as needed. Avoid caffeine. 





- My Orders


Last 24 Hours: 


My Active Orders





08/14/19 17:33


Cardiac Monitoring [RC] .As Directed 


Orthostatic Vital Signs [RC] ASDIRECTED 














- Assessment/Plan


Last 24 Hours: 


My Active Orders





08/14/19 17:33


Cardiac Monitoring [RC] .As Directed 


Orthostatic Vital Signs [RC] ASDIRECTED

## 2019-08-27 ENCOUNTER — HOSPITAL ENCOUNTER (EMERGENCY)
Dept: HOSPITAL 11 - JP.ED | Age: 24
Discharge: OTHER INSTITUTION | End: 2019-08-27
Payer: MEDICARE

## 2019-08-27 VITALS — DIASTOLIC BLOOD PRESSURE: 89 MMHG | SYSTOLIC BLOOD PRESSURE: 124 MMHG

## 2019-08-27 DIAGNOSIS — I10: ICD-10-CM

## 2019-08-27 DIAGNOSIS — F31.9: ICD-10-CM

## 2019-08-27 DIAGNOSIS — Z88.8: ICD-10-CM

## 2019-08-27 DIAGNOSIS — F99: ICD-10-CM

## 2019-08-27 DIAGNOSIS — F15.20: Primary | ICD-10-CM

## 2019-08-27 DIAGNOSIS — F41.0: ICD-10-CM

## 2019-08-27 PROCEDURE — 81025 URINE PREGNANCY TEST: CPT

## 2019-08-27 PROCEDURE — 80305 DRUG TEST PRSMV DIR OPT OBS: CPT

## 2019-08-27 PROCEDURE — 99285 EMERGENCY DEPT VISIT HI MDM: CPT

## 2019-08-27 PROCEDURE — 80048 BASIC METABOLIC PNL TOTAL CA: CPT

## 2019-08-27 PROCEDURE — G0480 DRUG TEST DEF 1-7 CLASSES: HCPCS

## 2019-08-27 PROCEDURE — 85027 COMPLETE CBC AUTOMATED: CPT

## 2019-08-27 PROCEDURE — 81001 URINALYSIS AUTO W/SCOPE: CPT

## 2019-08-27 PROCEDURE — 36415 COLL VENOUS BLD VENIPUNCTURE: CPT

## 2019-08-27 NOTE — EDM.PDOCBH
ED HPI GENERAL MEDICAL PROBLEM





- General


Chief Complaint: Behavioral/Psych


Stated Complaint: EVEL  MENTAL


Time Seen by Provider: 08/27/19 09:59


Source of Information: Reports: Patient, Old Records, RN


History Limitations: Reports: No Limitations





- History of Present Illness


INITIAL COMMENTS - FREE TEXT/NARRATIVE: 





23 yo female here for medical clearance before admission to a psych facility in 

[place]. His transport team is standing by at this time. Going to [place] on a 

civil commitment. 





Was recently given PCN in the clinic for a cough and is not yet fully over the 

coughing. Does smoke.


Onset: Gradual


Duration: Chronic


Location: Reports: Generalized


Quality: Reports: Other (no pain)


Severity: Moderate


Improves with: Reports: Medication


Worsens with: Reports: Other (non-compliance)


Context: Reports: Other (See HPI)


Associated Symptoms: Reports: Cough (dry), Shortness of Breath (only if she 

coughs very hard).  Denies: Fever/Chills


Treatments PTA: Reports: Other (see below) (PCN + usual psych meds)





- Related Data


 Allergies











Allergy/AdvReac Type Severity Reaction Status Date / Time


 


risperidone [From Risperdal] AdvReac  Delusions Verified 08/27/19 10:02











Home Meds: 


 Home Meds





Benztropine Mesylate 2 mg PO BID 12/05/18 [History]


Haloperidol Decanoate 150 mg IM ASDIRECTED 08/14/19 [History]


LORazepam 1 tab PO QID PRN 08/14/19 [History]


cloZAPine 2 tab PO BEDTIME 08/14/19 [History]











Past Medical History


HEENT History: Reports: None


Cardiovascular History: Reports: Hypertension


OB/GYN History: Reports: Pregnancy


Psychiatric History: Reports: Addiction, Anxiety, Bipolar, Panic Attack, Psych 

Hospitalization(s), Schizophrenia





- Infectious Disease History


Infectious Disease History: Reports: Chicken Pox, Measles, Mumps





- Past Surgical History


Head Surgeries/Procedures: Reports: None


HEENT Surgical History: Reports: Oral Surgery


Respiratory Surgical History: Reports: None


Dermatological Surgical History: Reports: None





Social & Family History





- Family History


Family Medical History: Noncontributory





- Caffeine Use


Caffeine Use: Reports: Coffee, Energy Drinks, Soda





ED ROS GENERAL





- Review of Systems


Review Of Systems: See Below


Constitutional: Reports: No Symptoms


HEENT: Reports: No Symptoms


Respiratory: Reports: Cough.  Denies: Shortness of Breath, Wheezing, Pleuritic 

Chest Pain, Sputum


Cardiovascular: Reports: No Symptoms


GI/Abdominal: Reports: No Symptoms


: Reports: No Symptoms


Musculoskeletal: Reports: No Symptoms


Skin: Reports: No Symptoms


Neurological: Reports: No Symptoms


Psychiatric: Reports: Other (chronic mental illness)





ED EXAM, BEHAVIORAL HEALTH





- Physical Exam


Exam: See Below


Exam Limited By: No Limitations


General Appearance: Alert, WD/WN, No Apparent Distress


Eye Exam: Bilateral Eye: Normal Inspection, PERRL


Ears: Normal External Exam, Normal Canal, Hearing Grossly Normal, Normal TMs


Nose: Normal Inspection, No Blood


Throat/Mouth: Normal Inspection, Normal Lips, Normal Oropharynx, Normal Voice, 

No Airway Compromise


Head: Atraumatic, Normocephalic


Neck: Normal Inspection


Respiratory/Chest: No Respiratory Distress, Lungs Clear, Normal Breath Sounds, 

No Accessory Muscle Use, Other (dry cough)


Cardiovascular: Regular Rate, Rhythm, No Edema


GI/Abdominal: Normal Bowel Sounds, Soft, Non-Tender, No Distention


Back Exam: Normal Inspection.  No: CVA Tenderness (R), CVA Tenderness (L)


Extremities: Normal Inspection, Normal Range of Motion, Non-Tender, No Pedal 

Edema


Neurological: Alert, Normal Mood/Affect, CN II-XII Intact, Normal Cognition, No 

Motor/Sensory Deficits, Oriented x 3


Psychiatric: Alert, Normal Affect, Normal Cognition, Normal Mood, Oriented


Skin Exam: Warm, Dry, Intact, Normal color, No rash





COURSE, BEHAVIORAL HEALTH COMP





- Course


Vital Signs: 


 Last Vital Signs











Temp  35.5 C   08/27/19 10:00


 


Pulse  121 H  08/27/19 10:00


 


Resp  16   08/27/19 10:00


 


BP  124/89   08/27/19 10:00


 


Pulse Ox  99   08/27/19 10:00











Orders, Labs, Meds: 


 Active Orders 24 hr











 Category Date Time Status


 


 UA W/MICROSCOPIC [URIN] Stat Lab  08/27/19 10:47 Ordered








 Laboratory Tests











  08/27/19 08/27/19 08/27/19 Range/Units





  10:07 10:07 10:07 


 


WBC  3.9 L    (4.5-11.0)  K/uL


 


RBC  4.53    (3.30-5.50)  M/uL


 


Hgb  13.0    (12.0-15.0)  g/dL


 


Hct  40.5    (36.0-48.0)  %


 


MCV  89    (80-98)  fL


 


MCH  29    (27-31)  pg


 


MCHC  32    (32-36)  %


 


Plt Count  137 L    (150-400)  K/uL


 


Sodium   140   (140-148)  mmol/L


 


Potassium   4.1   (3.6-5.2)  mmol/L


 


Chloride   106   (100-108)  mmol/L


 


Carbon Dioxide   26   (21-32)  mmol/L


 


Anion Gap   8.1   (5.0-14.0)  mmol/L


 


BUN   12   (7-18)  mg/dL


 


Creatinine   1.0   (0.6-1.0)  mg/dL


 


Est Cr Clr Drug Dosing   62.31   mL/min


 


Estimated GFR (MDRD)   > 60   (>60)  


 


Glucose   133 H   ()  mg/dL


 


Calcium   8.6   (8.5-10.1)  mg/dL


 


Urine HCG, Qual     


 


Urine Opiates Screen     (NEGATIVE)  


 


Ur Oxycodone Screen     (NEGATIVE)  


 


Urine Methadone Screen     (NEGATIVE)  


 


Ur Propoxyphene Screen     (NEGATIVE)  


 


Ur Barbiturates Screen     (NEGATIVE)  


 


Ur Tricyclics Screen     (NEGATIVE)  


 


Ur Phencyclidine Scrn     (NEGATIVE)  


 


Ur Amphetamine Screen     (NEGATIVE)  


 


U Methamphetamines Scrn     (NEGATIVE)  


 


Urine MDMA Screen     (NEGATIVE)  


 


U Benzodiazepines Scrn     (NEGATIVE)  


 


U Cocaine Metab Screen     (NEGATIVE)  


 


U Marijuana (THC) Screen     (NEGATIVE)  


 


Ethyl Alcohol    < 3  mg/dL














  08/27/19 08/27/19 Range/Units





  10:47 10:47 


 


WBC    (4.5-11.0)  K/uL


 


RBC    (3.30-5.50)  M/uL


 


Hgb    (12.0-15.0)  g/dL


 


Hct    (36.0-48.0)  %


 


MCV    (80-98)  fL


 


MCH    (27-31)  pg


 


MCHC    (32-36)  %


 


Plt Count    (150-400)  K/uL


 


Sodium    (140-148)  mmol/L


 


Potassium    (3.6-5.2)  mmol/L


 


Chloride    (100-108)  mmol/L


 


Carbon Dioxide    (21-32)  mmol/L


 


Anion Gap    (5.0-14.0)  mmol/L


 


BUN    (7-18)  mg/dL


 


Creatinine    (0.6-1.0)  mg/dL


 


Est Cr Clr Drug Dosing    mL/min


 


Estimated GFR (MDRD)    (>60)  


 


Glucose    ()  mg/dL


 


Calcium    (8.5-10.1)  mg/dL


 


Urine HCG, Qual  Negative   


 


Urine Opiates Screen   Negative  (NEGATIVE)  


 


Ur Oxycodone Screen   Negative  (NEGATIVE)  


 


Urine Methadone Screen   Negative  (NEGATIVE)  


 


Ur Propoxyphene Screen   Negative  (NEGATIVE)  


 


Ur Barbiturates Screen   Negative  (NEGATIVE)  


 


Ur Tricyclics Screen   Negative  (NEGATIVE)  


 


Ur Phencyclidine Scrn   Negative  (NEGATIVE)  


 


Ur Amphetamine Screen   Presumptive positive H  (NEGATIVE)  


 


U Methamphetamines Scrn   Presumptive positive H  (NEGATIVE)  


 


Urine MDMA Screen   Negative  (NEGATIVE)  


 


U Benzodiazepines Scrn   Presumptive positive H  (NEGATIVE)  


 


U Cocaine Metab Screen   Negative  (NEGATIVE)  


 


U Marijuana (THC) Screen   Negative  (NEGATIVE)  


 


Ethyl Alcohol    mg/dL














Departure





- Departure


Time of Disposition: 11:10


Disposition: DC/Tfer to Other [facility]


Condition: Good


Clinical Impression: 


 Chronic mental illness, Methamphetamine abuse








- Discharge Information


*PRESCRIPTION DRUG MONITORING PROGRAM REVIEWED*: No


*COPY OF PRESCRIPTION DRUG MONITORING REPORT IN PATIENT ARPAN: No


Referrals: 


Camille Fan PA-C [Primary Care Provider] - 


Forms:  ED Department Discharge


Additional Instructions: 


Medically cleared for transport to [facility]. 





- My Orders


Last 24 Hours: 


My Active Orders





08/27/19 10:47


UA W/MICROSCOPIC [URIN] Stat 














- Assessment/Plan


Last 24 Hours: 


My Active Orders





08/27/19 10:47


UA W/MICROSCOPIC [URIN] Stat

## 2020-01-10 ENCOUNTER — HOSPITAL ENCOUNTER (EMERGENCY)
Dept: HOSPITAL 11 - JP.ED | Age: 25
Discharge: HOME | End: 2020-01-10
Payer: MEDICARE

## 2020-01-10 VITALS — HEART RATE: 131 BPM | DIASTOLIC BLOOD PRESSURE: 80 MMHG | SYSTOLIC BLOOD PRESSURE: 132 MMHG

## 2020-01-10 DIAGNOSIS — K29.00: ICD-10-CM

## 2020-01-10 DIAGNOSIS — F17.210: ICD-10-CM

## 2020-01-10 DIAGNOSIS — Z88.8: ICD-10-CM

## 2020-01-10 DIAGNOSIS — E86.0: Primary | ICD-10-CM

## 2020-01-10 DIAGNOSIS — R05: ICD-10-CM

## 2020-01-10 PROCEDURE — 85027 COMPLETE CBC AUTOMATED: CPT

## 2020-01-10 PROCEDURE — 83690 ASSAY OF LIPASE: CPT

## 2020-01-10 PROCEDURE — 94640 AIRWAY INHALATION TREATMENT: CPT

## 2020-01-10 PROCEDURE — 96360 HYDRATION IV INFUSION INIT: CPT

## 2020-01-10 PROCEDURE — 81001 URINALYSIS AUTO W/SCOPE: CPT

## 2020-01-10 PROCEDURE — 80305 DRUG TEST PRSMV DIR OPT OBS: CPT

## 2020-01-10 PROCEDURE — 71046 X-RAY EXAM CHEST 2 VIEWS: CPT

## 2020-01-10 PROCEDURE — 99284 EMERGENCY DEPT VISIT MOD MDM: CPT

## 2020-01-10 PROCEDURE — 85379 FIBRIN DEGRADATION QUANT: CPT

## 2020-01-10 PROCEDURE — 84443 ASSAY THYROID STIM HORMONE: CPT

## 2020-01-10 PROCEDURE — 36415 COLL VENOUS BLD VENIPUNCTURE: CPT

## 2020-01-10 PROCEDURE — 80048 BASIC METABOLIC PNL TOTAL CA: CPT

## 2020-01-10 PROCEDURE — 83605 ASSAY OF LACTIC ACID: CPT

## 2020-01-10 NOTE — EDM.PDOC
ED HPI GENERAL MEDICAL PROBLEM





- General


Chief Complaint: Respiratory Problem


Stated Complaint: DRY COUGHING, VOMITING


Time Seen by Provider: 01/10/20 20:45


Source of Information: Reports: Patient, Family, Old Records


History Limitations: Reports: Other (patient is a poor historian)





- History of Present Illness


INITIAL COMMENTS - FREE TEXT/NARRATIVE: 





23 yo female presents with a complaint of a dry cough for a couple of weeks. 

She coughs to the point of vomiting. Now also has epigastric pain that is 

fairly new. Has been seen a couple times in the clinic. The first times he was 

tx'd with a course of Cefdinir. When she didn't improve she was given prednisone

(2 courses) that she is mostly done with also without benefit. Was given an 

inhaler that she does not know the name of(albuterol) that is also not helping. 

No vomiting separate from coughing. No fever or SOB. Noticed today that her 

heart rate is elevated over her normal. No black stools or diarrhea. 


Onset: Gradual


Duration: Week(s):, Constant


Location: Reports: Chest, Abdomen (epigastrium)


Quality: Reports: Burning (epigastric)


Severity: Moderate


Improves with: Reports: None


Worsens with: Reports: Other (? time)


Context: Reports: Other (see HPI)


Associated Symptoms: Reports: Cough, Nausea/Vomiting (only with coughing).  

Denies: Fever/Chills, Shortness of Breath


Treatments PTA: Reports: Other (see below)


Other Treatments PTA: antibiotics


  ** epigastric


Pain Score (Numeric/FACES): 8





- Related Data


 Allergies











Allergy/AdvReac Type Severity Reaction Status Date / Time


 


risperidone [From Risperdal] AdvReac  Delusions Verified 01/10/20 20:32











Home Meds: 


 Home Meds





Benztropine Mesylate 2 mg PO BID 12/05/18 [History]


Haloperidol Decanoate 150 mg IM ASDIRECTED 08/14/19 [History]


LORazepam 1 tab PO QID PRN 08/14/19 [History]


cloZAPine 2 tab PO BEDTIME 08/14/19 [History]


Benzonatate [Tessalon Perle] 100 - 200 mg PO QID PRN #40 capsule 01/10/20 [Rx]


Sucralfate [Carafate] 1 gm PO QID #100 tablet 01/10/20 [Rx]


predniSONE 40 mg PO BID 01/10/20 [History]











Past Medical History


HEENT History: Reports: None


Cardiovascular History: Reports: Hypertension


OB/GYN History: Reports: Pregnancy


Psychiatric History: Reports: Addiction, Anxiety, Bipolar, Panic Attack, Psych 

Hospitalization(s), Schizophrenia





- Infectious Disease History


Infectious Disease History: Reports: Chicken Pox





- Past Surgical History


Head Surgeries/Procedures: Reports: None


HEENT Surgical History: Reports: Oral Surgery


Respiratory Surgical History: Reports: None


Dermatological Surgical History: Reports: None





Social & Family History





- Family History


Family Medical History: Noncontributory





- Tobacco Use


Smoking Status *Q: Current Every Day Smoker


Years of Tobacco use: 13


Packs/Tins Daily: 0.5


Second Hand Smoke Exposure: Yes





- Caffeine Use


Caffeine Use: Reports: Coffee, Soda





- Recreational Drug Use


Recreational Drug Use: No





ED ROS GENERAL





- Review of Systems


Review Of Systems: See Below


Constitutional: Reports: No Symptoms


HEENT: Reports: No Symptoms


Respiratory: Reports: Cough.  Denies: Shortness of Breath, Wheezing, Pleuritic 

Chest Pain, Sputum, Hemoptysis


Cardiovascular: Reports: Other (tachycardia)


Endocrine: Reports: No Symptoms


GI/Abdominal: Reports: Abdominal Pain (epigastric), Vomiting.  Denies: Black 

Stool, Bloody Stool, Constipation, Diarrhea, Hematemesis, Hematochezia, Melena


: Reports: No Symptoms


Musculoskeletal: Reports: No Symptoms


Skin: Reports: No Symptoms


Neurological: Reports: No Symptoms





ED EXAM, GENERAL





- Physical Exam


Exam: See Below


Exam Limited By: No Limitations


General Appearance: Alert, WD/WN, No Apparent Distress


Eye Exam: Bilateral Eye: Normal Inspection


Ears: Normal External Exam, Normal Canal, Hearing Grossly Normal


Ear Exam: Bilateral Ear: Auricle Normal, Canal Normal


Nose: Normal Inspection, No Blood


Throat/Mouth: Normal Inspection, Normal Lips, Normal Oropharynx, Normal Voice, 

No Airway Compromise


Head: Atraumatic, Normocephalic


Neck: Normal Inspection


Respiratory/Chest: No Respiratory Distress, No Accessory Muscle Use, Chest Non-

Tender, Rales (diffusely)


Cardiovascular: Regular Rate, Rhythm, No Edema, Tachycardia


GI/Abdominal: Normal Bowel Sounds, Soft, Tender (epigastric tenderness).  No: 

Non-Tender, Distended, Guarding, Rigid, Rebound, Hernia


Back Exam: Normal Inspection.  No: CVA Tenderness (R), CVA Tenderness (L)


Extremities: Normal Inspection, Normal Range of Motion, Non-Tender, No Pedal 

Edema


Neurological: Alert, Oriented, CN II-XII Intact, Normal Cognition, No Motor/

Sensory Deficits


Psychiatric: Normal Affect, Normal Mood


Skin Exam: Warm, Dry, Intact, Normal Color, No Rash





Course





- Vital Signs


Text/Narrative:: 





GI cocktail po-partial relief. 





HR down about 25/min after a liter of LR





Cough better, not gone, with Tessalon.


Last Recorded V/S: 


 Last Vital Signs











Temp  36.8 C   01/10/20 20:35


 


Pulse  131 H  01/10/20 22:08


 


Resp  18   01/10/20 20:35


 


BP  132/80   01/10/20 22:08


 


Pulse Ox  94 L  01/10/20 22:08














- Orders/Labs/Meds


Orders: 


 Active Orders 24 hr











 Category Date Time Status


 


 RT Aerosol Therapy [RC] ASDIRECTED Care  01/10/20 20:55 Active


 


 RT Peak Flow Measurement [RC] ASDIRECTED Care  01/10/20 20:55 Active


 


 Chest 2V [CR] Stat Exams  01/10/20 21:35 Taken


 


 Codeine/guaiFENesin [Robitussin AC] Med  01/10/20 23:21 Once





 10 ml PO ONETIME ONE   











Labs: 


 Laboratory Tests











  01/10/20 01/10/20 01/10/20 Range/Units





  21:01 21:01 21:02 


 


WBC  12.7 H    (4.5-11.0)  K/uL


 


RBC  4.75    (3.30-5.50)  M/uL


 


Hgb  13.7    (12.0-15.0)  g/dL


 


Hct  41.0    (36.0-48.0)  %


 


MCV  86    (80-98)  fL


 


MCH  29    (27-31)  pg


 


MCHC  33    (32-36)  %


 


Plt Count  218    (150-400)  K/uL


 


D-Dimer, Quantitative    < 100  (0.0-400.0)  ng/mL


 


Sodium   136 L   (140-148)  mmol/L


 


Potassium   4.3   (3.6-5.2)  mmol/L


 


Chloride   102   (100-108)  mmol/L


 


Carbon Dioxide   22   (21-32)  mmol/L


 


Anion Gap   16.3 H   (5.0-14.0)  mmol/L


 


BUN   13   (7-18)  mg/dL


 


Creatinine   1.0   (0.6-1.0)  mg/dL


 


Est Cr Clr Drug Dosing   62.31   mL/min


 


Estimated GFR (MDRD)   > 60   (>60)  


 


Glucose   155 H   ()  mg/dL


 


Lactic Acid     (0.4-2.0)  mmol/L


 


Calcium   8.6   (8.5-10.1)  mg/dL


 


Lipase   85   ()  U/L


 


TSH, Ultra Sensitive     (0.358-3.740)  uIU/mL


 


Urine Color     (YELLOW)  


 


Urine Appearance     (CLEAR)  


 


Urine pH     (5.0-8.0)  


 


Ur Specific Gravity     (1.008-1.030)  


 


Urine Protein     (NEGATIVE)  mg/dL


 


Urine Glucose (UA)     (NEGATIVE)  mg/dL


 


Urine Ketones     (NEGATIVE)  mg/dL


 


Urine Occult Blood     (NEGATIVE)  


 


Urine Nitrite     (NEGATIVE)  


 


Urine Bilirubin     (NEGATIVE)  


 


Urine Urobilinogen     (0.2-1.0)  EU/dL


 


Ur Leukocyte Esterase     (NEGATIVE)  


 


Urine RBC     (0-5)  


 


Urine WBC     (0-5)  


 


Ur Epithelial Cells     


 


Amorphous Sediment     


 


Urine Bacteria     


 


Urine Mucus     


 


Urine Opiates Screen     (NEGATIVE)  


 


Ur Oxycodone Screen     (NEGATIVE)  


 


Urine Methadone Screen     (NEGATIVE)  


 


Ur Propoxyphene Screen     (NEGATIVE)  


 


Ur Barbiturates Screen     (NEGATIVE)  


 


Ur Tricyclics Screen     (NEGATIVE)  


 


Ur Phencyclidine Scrn     (NEGATIVE)  


 


Ur Amphetamine Screen     (NEGATIVE)  


 


U Methamphetamines Scrn     (NEGATIVE)  


 


Urine MDMA Screen     (NEGATIVE)  


 


U Benzodiazepines Scrn     (NEGATIVE)  


 


U Cocaine Metab Screen     (NEGATIVE)  


 


U Marijuana (THC) Screen     (NEGATIVE)  














  01/10/20 01/10/20 01/10/20 Range/Units





  21:45 21:45 22:09 


 


WBC     (4.5-11.0)  K/uL


 


RBC     (3.30-5.50)  M/uL


 


Hgb     (12.0-15.0)  g/dL


 


Hct     (36.0-48.0)  %


 


MCV     (80-98)  fL


 


MCH     (27-31)  pg


 


MCHC     (32-36)  %


 


Plt Count     (150-400)  K/uL


 


D-Dimer, Quantitative     (0.0-400.0)  ng/mL


 


Sodium     (140-148)  mmol/L


 


Potassium     (3.6-5.2)  mmol/L


 


Chloride     (100-108)  mmol/L


 


Carbon Dioxide     (21-32)  mmol/L


 


Anion Gap     (5.0-14.0)  mmol/L


 


BUN     (7-18)  mg/dL


 


Creatinine     (0.6-1.0)  mg/dL


 


Est Cr Clr Drug Dosing     mL/min


 


Estimated GFR (MDRD)     (>60)  


 


Glucose     ()  mg/dL


 


Lactic Acid   3.7 H   (0.4-2.0)  mmol/L


 


Calcium     (8.5-10.1)  mg/dL


 


Lipase     ()  U/L


 


TSH, Ultra Sensitive  0.414    (0.358-3.740)  uIU/mL


 


Urine Color    Yellow  (YELLOW)  


 


Urine Appearance    Clear  (CLEAR)  


 


Urine pH    7.0  (5.0-8.0)  


 


Ur Specific Gravity    1.025  (1.008-1.030)  


 


Urine Protein    Negative  (NEGATIVE)  mg/dL


 


Urine Glucose (UA)    Negative  (NEGATIVE)  mg/dL


 


Urine Ketones    Negative  (NEGATIVE)  mg/dL


 


Urine Occult Blood    Negative  (NEGATIVE)  


 


Urine Nitrite    Negative  (NEGATIVE)  


 


Urine Bilirubin    Negative  (NEGATIVE)  


 


Urine Urobilinogen    0.2  (0.2-1.0)  EU/dL


 


Ur Leukocyte Esterase    Negative  (NEGATIVE)  


 


Urine RBC    0-5  (0-5)  


 


Urine WBC    0-5  (0-5)  


 


Ur Epithelial Cells    Many  


 


Amorphous Sediment    Few  


 


Urine Bacteria    Rare  


 


Urine Mucus    Not seen  


 


Urine Opiates Screen     (NEGATIVE)  


 


Ur Oxycodone Screen     (NEGATIVE)  


 


Urine Methadone Screen     (NEGATIVE)  


 


Ur Propoxyphene Screen     (NEGATIVE)  


 


Ur Barbiturates Screen     (NEGATIVE)  


 


Ur Tricyclics Screen     (NEGATIVE)  


 


Ur Phencyclidine Scrn     (NEGATIVE)  


 


Ur Amphetamine Screen     (NEGATIVE)  


 


U Methamphetamines Scrn     (NEGATIVE)  


 


Urine MDMA Screen     (NEGATIVE)  


 


U Benzodiazepines Scrn     (NEGATIVE)  


 


U Cocaine Metab Screen     (NEGATIVE)  


 


U Marijuana (THC) Screen     (NEGATIVE)  














  01/10/20 Range/Units





  22:09 


 


WBC   (4.5-11.0)  K/uL


 


RBC   (3.30-5.50)  M/uL


 


Hgb   (12.0-15.0)  g/dL


 


Hct   (36.0-48.0)  %


 


MCV   (80-98)  fL


 


MCH   (27-31)  pg


 


MCHC   (32-36)  %


 


Plt Count   (150-400)  K/uL


 


D-Dimer, Quantitative   (0.0-400.0)  ng/mL


 


Sodium   (140-148)  mmol/L


 


Potassium   (3.6-5.2)  mmol/L


 


Chloride   (100-108)  mmol/L


 


Carbon Dioxide   (21-32)  mmol/L


 


Anion Gap   (5.0-14.0)  mmol/L


 


BUN   (7-18)  mg/dL


 


Creatinine   (0.6-1.0)  mg/dL


 


Est Cr Clr Drug Dosing   mL/min


 


Estimated GFR (MDRD)   (>60)  


 


Glucose   ()  mg/dL


 


Lactic Acid   (0.4-2.0)  mmol/L


 


Calcium   (8.5-10.1)  mg/dL


 


Lipase   ()  U/L


 


TSH, Ultra Sensitive   (0.358-3.740)  uIU/mL


 


Urine Color   (YELLOW)  


 


Urine Appearance   (CLEAR)  


 


Urine pH   (5.0-8.0)  


 


Ur Specific Gravity   (1.008-1.030)  


 


Urine Protein   (NEGATIVE)  mg/dL


 


Urine Glucose (UA)   (NEGATIVE)  mg/dL


 


Urine Ketones   (NEGATIVE)  mg/dL


 


Urine Occult Blood   (NEGATIVE)  


 


Urine Nitrite   (NEGATIVE)  


 


Urine Bilirubin   (NEGATIVE)  


 


Urine Urobilinogen   (0.2-1.0)  EU/dL


 


Ur Leukocyte Esterase   (NEGATIVE)  


 


Urine RBC   (0-5)  


 


Urine WBC   (0-5)  


 


Ur Epithelial Cells   


 


Amorphous Sediment   


 


Urine Bacteria   


 


Urine Mucus   


 


Urine Opiates Screen  Negative  (NEGATIVE)  


 


Ur Oxycodone Screen  Negative  (NEGATIVE)  


 


Urine Methadone Screen  Negative  (NEGATIVE)  


 


Ur Propoxyphene Screen  Negative  (NEGATIVE)  


 


Ur Barbiturates Screen  Negative  (NEGATIVE)  


 


Ur Tricyclics Screen  Negative  (NEGATIVE)  


 


Ur Phencyclidine Scrn  Negative  (NEGATIVE)  


 


Ur Amphetamine Screen  Negative  (NEGATIVE)  


 


U Methamphetamines Scrn  Negative  (NEGATIVE)  


 


Urine MDMA Screen  Negative  (NEGATIVE)  


 


U Benzodiazepines Scrn  Presumptive positive H  (NEGATIVE)  


 


U Cocaine Metab Screen  Negative  (NEGATIVE)  


 


U Marijuana (THC) Screen  Negative  (NEGATIVE)  











Meds: 


Medications














Discontinued Medications














Generic Name Dose Route Start Last Admin





  Trade Name Freq  PRN Reason Stop Dose Admin


 


Acetaminophen  1,000 mg  01/10/20 21:17  01/10/20 21:36





  Tylenol Extra Strength  PO  01/10/20 21:18  1,000 mg





  ONETIME ONE   Administration





     





     





     





     


 


Albuterol/Ipratropium  3 ml  01/10/20 20:55  01/10/20 21:09





  Duoneb 3.0-0.5 Mg/3 Ml  NEB  01/10/20 20:56  3 ml





  ONETIME ONE   Administration





     





     





     





     


 


Benzonatate  200 mg  01/10/20 22:34  01/10/20 22:47





  Tessalon Perles  PO  01/10/20 22:35  200 mg





  ONETIME ONE   Administration





     





     





     





     


 


Al Hydroxide/Mg Hydroxide 15  0 ml  01/10/20 20:54  01/10/20 21:09





  ml/ Lidocaine HCl 15 ml  PO  01/10/20 20:55  30 ml





  ONETIME ONE   Administration





     





     





     





     


 


Lactated Ringer's  1,000 mls @ 1,000 mls/hr  01/10/20 22:18  01/10/20 22:29





  Ringers, Lactated  IV  01/10/20 23:17  1,000 mls/hr





  BOLUS ONE   Administration





     





     





     





     


 


Sucralfate  1 gm  01/10/20 21:17  01/10/20 22:20





  Carafate  PO  01/10/20 21:18  1 gm





  ONETIME ONE   Administration





     





     





     





     














- Radiology Interpretation


Free Text/Narrative:: 


CXR-


Findings/Impression:


Cardiovascular and mediastinum: Heart size and vasculature are normal in 

caliber and appearance. Mediastinum is within normal limits. 





Lungs and pleural spaces: Lungs are clear. No sign of infiltrate or mass. No 

sign of pleural effusion. No pneumothorax. 





Bones and soft tissues: No significant change.





Dictated by Myke Neumann MD @ [facility] 10 2020 10:20PM


(Electronic Signature)





Departure





- Departure


Time of Disposition: 23:35


Disposition: Home, Self-Care 01


Condition: Fair


Clinical Impression: 


 Mild dehydration, Cough





Gastritis


Qualifiers:


 Gastritis type: unspecified gastritis Chronicity: acute Gastritis bleeding: 

without bleeding Qualified Code(s): K29.00 - Acute gastritis without bleeding








- Discharge Information


*PRESCRIPTION DRUG MONITORING PROGRAM REVIEWED*: No


*COPY OF PRESCRIPTION DRUG MONITORING REPORT IN PATIENT ARPAN: No


Prescriptions: 


Benzonatate [Tessalon Perle] 100 - 200 mg PO QID PRN #40 capsule


 PRN Reason: Cough


Sucralfate [Carafate] 1 gm PO QID #100 tablet


Instructions:  Gastritis, Adult, Easy-to-Read, Cough, Adult, Easy-to-Read


Referrals: 


Camille Fan PA-C [Primary Care Provider] - 


Forms:  ED Department Discharge


Additional Instructions: 


No smoking. Take Tessalon as directed for cough. Take Carafate 20 minutes 

before meals and at bedtime to heal your gastritis. Stop your prednisone as 

this is likely causing the gastritis. Drink enough fluids so your urine is 

light yellow in color. 





Avoid carbonated beverages, caffeine, ibuprofen, Aleve, or aspirin until your 

stomach has healed. 





Recheck with your doctor if not improving. 





Sepsis Event Note





- Evaluation


Sepsis Screening Result: No Definite Risk





- Focused Exam


Vital Signs: 


 Vital Signs











  Temp Pulse Resp BP Pulse Ox


 


 01/10/20 22:08   131 H   132/80  94 L


 


 01/10/20 20:35  36.8 C  137 H  18  157/86 H  97


 


 01/10/20 20:17  36.8 C  137 H  18  157/86 H  97











Date Exam was Performed: 01/10/20


Time Exam was Performed: 23:22





- My Orders


Last 24 Hours: 


My Active Orders





01/10/20 20:55


RT Aerosol Therapy [RC] ASDIRECTED 


RT Peak Flow Measurement [RC] ASDIRECTED 





01/10/20 21:35


Chest 2V [CR] Stat 





01/10/20 23:21


Codeine/guaiFENesin [Robitussin AC]   10 ml PO ONETIME ONE 














- Assessment/Plan


Last 24 Hours: 


My Active Orders





01/10/20 20:55


RT Aerosol Therapy [RC] ASDIRECTED 


RT Peak Flow Measurement [RC] ASDIRECTED 





01/10/20 21:35


Chest 2V [CR] Stat 





01/10/20 23:21


Codeine/guaiFENesin [Robitussin AC]   10 ml PO ONETIME ONE

## 2020-01-13 NOTE — CRLCR
----- ADDENDUM -----

Indication:

Cough



Technique:

Chest 2 views



Comparison:

10/26/2011



Findings/Impression:

Cardiovascular and mediastinum: Heart size and vasculature are normal in 
caliber and appearance. Mediastinum is within normal limits. 



Lungs and pleural spaces: Lungs are clear. No sign of infiltrate or mass. No 
sign of pleural effusion. No pneumothorax. 



Bones and soft tissues: No significant change.

Dictated by Myke Neumann MD @ [place] 10 2020 10:20PM

Signed by: Myke Neumann MD @1/10/2020 10:21:58 PM

(Electronic Signature)
MTDD

## 2020-03-06 ENCOUNTER — HOSPITAL ENCOUNTER (OUTPATIENT)
Dept: HOSPITAL 11 - JP.SDS | Age: 25
Discharge: HOME | End: 2020-03-06
Attending: SURGERY
Payer: MEDICARE

## 2020-03-06 VITALS — DIASTOLIC BLOOD PRESSURE: 77 MMHG | SYSTOLIC BLOOD PRESSURE: 127 MMHG

## 2020-03-06 VITALS — HEART RATE: 102 BPM

## 2020-03-06 DIAGNOSIS — K29.70: Primary | ICD-10-CM

## 2020-03-06 DIAGNOSIS — K31.89: ICD-10-CM

## 2020-03-06 PROCEDURE — 43239 EGD BIOPSY SINGLE/MULTIPLE: CPT

## 2020-03-06 PROCEDURE — 81025 URINE PREGNANCY TEST: CPT

## 2020-03-06 PROCEDURE — 87081 CULTURE SCREEN ONLY: CPT

## 2020-03-16 NOTE — OR
DATE OF PROCEDURE:  03/06/2020

 

SURGEON:  Siddharth David MD

 

PREOPERATIVE DIAGNOSIS:  Postprandial bloating and epigastric discomfort.

 

POSTOPERATIVE DIAGNOSES:

1. Postprandial bloating and epigastric discomfort.

2. Large gastric bezoar/retained gastric bile.

3. Mild diffuse gastritis secondary to retained gastric bile.

 

OPERATIVE PROCEDURE:  Esophagogastroduodenoscopy with antral biopsies for CLOtest.

 

ANESTHESIA:  IV sedation.

 

INDICATION FOR PROCEDURE:  This is a 24-year-old female presenting with some postprandial

bloating, epigastric pain, and some degree of heartburn.  She recently was started on

Protonix, but did not notice any improvement in the symptoms.  The plan is to proceed with

an upper GI endoscopy with biopsies as indicated.  Potential risks including bleeding and

perforation were discussed, and the patient wishes to proceed.

 

DETAILS OF PROCEDURE:  The patient was taken to the operating room and placed in a left

lateral decubitus position.  IV sedation was administered, after which the upper GI

endoscope was passed orally through the length of the esophagus into the stomach with

retroflexion view of the fundus, and thereafter through the pyloric channel and into the

junction of the 3rd and 4th portions of the duodenum.

 

Findings included normal hypopharynx, larynx, upper esophageal sphincter, and esophageal

body.  No significant hiatal hernia was present and there was no evident esophagitis.  As

one entered the stomach, there was a fairly large bezoar present along with general

retention of a fairly large amount of bile and this was associated with a mild diffuse

redness of the stomach, likely related to the retained bile.  As one passed through the

pyloric channel, the pyloric channel and visualized portions of the duodenum were

unremarkable.  At this point, biopsies were taken from the antrum and sent for CLOtest for

H. pylori.  Minimal bleeding from the biopsy site was seen and the procedure then concluded.

 

The overall finding would imply impaired gastric emptying.  At this age, one wonders whether

this might be some medication side effect.  The patient and her mother will be instructed

postprocedure with dietary, on an anti-bezoar diet.  At this point, I will not start the

patient on any prokinetic agents, which could otherwise include Reglan and erythromycin or

Zithromax.  One might consider medication review to see if any of her current medications

are associated with

significant impairment of gastric emptying.  The patient will be instructed to follow up

with Camille Fan PA-C, at [facility].

 

 

 

 

Siddharth David MD

DD:  03/13/2020 21:08:48

DT:  03/16/2020 13:42:23

Job #:  [id number]

## 2020-09-02 ENCOUNTER — HOSPITAL ENCOUNTER (EMERGENCY)
Dept: HOSPITAL 11 - JP.ED | Age: 25
Discharge: HOME | End: 2020-09-02
Payer: MEDICARE

## 2020-09-02 VITALS — HEART RATE: 103 BPM | DIASTOLIC BLOOD PRESSURE: 95 MMHG | SYSTOLIC BLOOD PRESSURE: 141 MMHG

## 2020-09-02 DIAGNOSIS — F17.210: ICD-10-CM

## 2020-09-02 DIAGNOSIS — Z88.8: ICD-10-CM

## 2020-09-02 DIAGNOSIS — Z79.899: ICD-10-CM

## 2020-09-02 DIAGNOSIS — N93.8: Primary | ICD-10-CM

## 2020-09-02 DIAGNOSIS — E66.9: ICD-10-CM

## 2020-09-02 NOTE — EDM.PDOC
ED HPI GENERAL MEDICAL PROBLEM





- General


Chief Complaint: General


Stated Complaint: VAGINAL BLEEDING


Time Seen by Provider: 09/02/20 18:30


Source of Information: Reports: Patient, Family


History Limitations: Reports: No Limitations





- History of Present Illness


INITIAL COMMENTS - FREE TEXT/NARRATIVE: 





25-year-old female who is struggling with anovulatory, irregular menstrual cycl

es and is being worked up and evaluated at the clinic.  She has an ultrasound 

scheduled for early next week.  Today she passed some dark clots, felt 

lightheaded so wanted to be evaluated.  She also has some vague lower abdominal 

discomfort, no fevers or chills.  Mild urinary urgency but no dysuria.


Onset: Unknown/Unsure (Symptoms have been ongoing for several weeks)


Associated Symptoms: Reports: Malaise, Weakness, Other (Lightheaded when 

standing).  Denies: Chest Pain, Cough, Fever/Chills, Headaches, Nausea/Vomiting


  ** Pelvic


Pain Score (Numeric/FACES): 6





- Related Data


                                    Allergies











Allergy/AdvReac Type Severity Reaction Status Date / Time


 


risperidone [From Risperdal] AdvReac  Delusions Verified 01/10/20 20:32











Home Meds: 


                                    Home Meds





LORazepam 1 mg PO QID PRN 08/14/19 [History]


cloZAPine 125 mg PO BEDTIME 08/14/19 [History]


Pantoprazole Sodium [Protonix] 40 mg PO DAILY 03/03/20 [History]


Metoprolol Succinate 25 mg PO QPM 09/02/20 [History]











Past Medical History


HEENT History: Reports: None


Cardiovascular History: Reports: Other (See Below)


Other Cardiovascular History: high pulse rate-? r/t medications


Respiratory History: Reports: Bronchitis, Recurrent


Gastrointestinal History: Reports: Chronic Constipation, Chronic Diarrhea, 

Gastritis


OB/GYN History: Reports: Pregnancy


Psychiatric History: Reports: Addiction, Anxiety, Bipolar, Panic Attack, Psych 

Hospitalization(s), Schizophrenia


Endocrine/Metabolic History: Reports: Obesity/BMI 30+





- Infectious Disease History


Infectious Disease History: Reports: Chicken Pox





- Past Surgical History


Head Surgeries/Procedures: Reports: None


HEENT Surgical History: Reports: Oral Surgery


Cardiovascular Surgical History: Reports: None


Respiratory Surgical History: Reports: None


GI Surgical History: Reports: None


Endocrine Surgical History: Reports: None


Dermatological Surgical History: Reports: None





Social & Family History





- Family History


Family Medical History: Noncontributory





- Tobacco Use


Smoking Status *Q: Current Every Day Smoker


Years of Tobacco use: 12


Packs/Tins Daily: 0.5





- Caffeine Use


Caffeine Use: Reports: None





- Recreational Drug Use


Recreational Drug Use: No





ED ROS GENERAL





- Review of Systems


Review Of Systems: See Below


Constitutional: Reports: Malaise.  Denies: Fever, Chills


HEENT: Reports: No Symptoms


Respiratory: Denies: Shortness of Breath


Cardiovascular: Denies: Chest Pain


GI/Abdominal: Reports: Abdominal Pain.  Denies: Nausea, Vomiting


: Reports: Urgency


Skin: Reports: No Symptoms


Neurological: Reports: Dizziness


Psychiatric: Reports: Other (Bipolar history, stable)





ED EXAM, GENERAL





- Physical Exam


Exam: See Below


Exam Limited By: No Limitations


General Appearance: Alert, No Apparent Distress


Eye Exam: Bilateral Eye: Normal Inspection


Head: Atraumatic


Respiratory/Chest: No Respiratory Distress, Lungs Clear


Cardiovascular: Regular Rate, Rhythm


GI/Abdominal: Soft, Tender (Some vague tenderness to palpation across the lower 

abdomen in the no focal guarding or rebound)


Neurological: Alert, Oriented


Psychiatric: Normal Affect, Normal Mood





Course





- Vital Signs


Last Recorded V/S: 


                                Last Vital Signs











Temp  97.3 F   09/02/20 18:28


 


Pulse  103 H  09/02/20 18:28


 


Resp  16   09/02/20 18:28


 


BP  141/95 H  09/02/20 18:28


 


Pulse Ox  96   09/02/20 18:28














- Orders/Labs/Meds


Labs: 


                                Laboratory Tests











  09/02/20 09/02/20 09/02/20 Range/Units





  18:42 18:55 18:55 


 


WBC   7.3   (4.5-11.0)  K/uL


 


RBC   5.00   (3.30-5.50)  M/uL


 


Hgb   14.4   (12.0-15.0)  g/dL


 


Hct   42.8   (36.0-48.0)  %


 


MCV   86   (80-98)  fL


 


MCH   29   (27-31)  pg


 


MCHC   34   (32-36)  %


 


Plt Count   186   (150-400)  K/uL


 


Neut % (Auto)   63   (36-66)  %


 


Lymph % (Auto)   27   (24-44)  %


 


Mono % (Auto)   8 H   (2-6)  %


 


Eos % (Auto)   1 L   (2-4)  %


 


Baso % (Auto)   0   (0-1)  %


 


HCG, Qual    Negative  


 


Urine Color  Yellow    (YELLOW)  


 


Urine Appearance  Slightly cloudy A    (CLEAR)  


 


Urine pH  6.5    (5.0-8.0)  


 


Ur Specific Gravity  1.020    (1.008-1.030)  


 


Urine Protein  Negative    (NEGATIVE)  mg/dL


 


Urine Glucose (UA)  Negative    (NEGATIVE)  mg/dL


 


Urine Ketones  Negative    (NEGATIVE)  mg/dL


 


Urine Occult Blood  Negative    (NEGATIVE)  


 


Urine Nitrite  Negative    (NEGATIVE)  


 


Urine Bilirubin  Negative    (NEGATIVE)  


 


Urine Urobilinogen  0.2    (0.2-1.0)  EU/dL


 


Ur Leukocyte Esterase  Negative    (NEGATIVE)  


 


Urine RBC  0-5    (0-5)  


 


Urine WBC  0-5    (0-5)  


 


Ur Epithelial Cells  Moderate    


 


Urine Bacteria  Many    


 


Urine Mucus  Not seen    














- Re-Assessments/Exams


Free Text/Narrative Re-Assessment/Exam: 





09/02/20 20:59


UA was obtained by clean-catch, CBC and qualitative serum beta-hCG was drawn.  

Hemoglobin and white count were normal, hCG was 0.  Urine was clear.  Patient 

was reassured and will follow-up at the clinic as scheduled.





Departure





- Departure


Time of Disposition: 19:24


Disposition: Home, Self-Care 01


Clinical Impression: 


 Dysfunctional uterine bleeding








- Discharge Information


Instructions:  Abnormal Uterine Bleeding


Referrals: 


Camille Fan PA-C [Primary Care Provider] - 


Forms:  ED Department Discharge


Care Plan Goals: 


Activity as tolerated, follow-up with your primary provider for ultrasound and 

rechecks as scheduled.  Return sooner if worsening such as fever or increased 

pain.





Sepsis Event Note (ED)





- Evaluation


Sepsis Screening Result: No Definite Risk





- Focused Exam


Vital Signs: 


                                   Vital Signs











  Temp Pulse Resp BP Pulse Ox


 


 09/02/20 18:28  97.3 F  103 H  16  141/95 H  96


 


 09/02/20 18:16  97.3 F  103 H  16  141/95 H  96

## 2020-10-27 ENCOUNTER — HOSPITAL ENCOUNTER (OUTPATIENT)
Dept: HOSPITAL 11 - JP.SDS | Age: 25
Discharge: HOME | End: 2020-10-27
Attending: SURGERY
Payer: MEDICARE

## 2020-10-27 VITALS — DIASTOLIC BLOOD PRESSURE: 77 MMHG | SYSTOLIC BLOOD PRESSURE: 117 MMHG | HEART RATE: 112 BPM

## 2020-10-27 DIAGNOSIS — E66.9: ICD-10-CM

## 2020-10-27 DIAGNOSIS — Z88.8: ICD-10-CM

## 2020-10-27 DIAGNOSIS — T18.2XXA: Primary | ICD-10-CM

## 2020-10-27 DIAGNOSIS — J45.909: ICD-10-CM

## 2020-10-27 PROCEDURE — 81025 URINE PREGNANCY TEST: CPT

## 2020-10-27 PROCEDURE — 43247 EGD REMOVE FOREIGN BODY: CPT

## 2020-10-27 PROCEDURE — 43239 EGD BIOPSY SINGLE/MULTIPLE: CPT

## 2020-10-27 PROCEDURE — 87081 CULTURE SCREEN ONLY: CPT

## 2020-11-03 NOTE — OR
DATE OF PROCEDURE:  10/27/2020

 

SURGEON:  Siddharth David MD

 

PREOPERATIVE DIAGNOSIS:  Persistent epigastric discomfort with history of gastric bezoars.

 

POSTOPERATIVE DIAGNOSIS:  Large volume of retained gastric contents (bezoar) consistent with

gastroparesis refractory to medical management.

 

OPERATIVE PROCEDURE:  Esophagogastroduodenoscopy with:

1. Partial evacuation of gastric bezoar contents (13372).

2. Biopsies of the antrum for CLOtest (30007).

 

ANESTHESIA:  General.

 

INDICATIONS FOR PROCEDURE:  This is a 25-year-old presenting with some persistent epigastric

discomfort.  She is noted to have some bezoar formation and is better on a course of

Zithromax in order to augment gastric emptying.  Despite that, she continued to be quite

symptomatic, and a followup upper endoscopy is to be performed at this time.  Potential

risks of procedure including bleeding and perforation were discussed, and the patient wishes

to proceed.

 

DETAILS OF PROCEDURE:  The patient was taken to the operating room, placed in a left lateral

decubitus position.  IV sedation was administered, after which the upper GI endoscope was

passed orally through the length of the esophagus and into the stomach with retroflexion

view of the fundus, and thereafter through the pyloric channel and into the proximal

duodenum.

 

Findings included some redness and retained food within the distal esophagus consistent with

ongoing bezoar formation within the stomach.  There was a quite large amount of bezoar

present in the stomach.  This was evacuated by means of __________.  It was quite friable,

and at that point it was felt unlikely to be successful and may need for a stent.  There is

some general redness in the stomach related to bezoar present.  Biopsies were obtained from

the antrum once again to assess the patient's H pylori status.  No bleeding from the biopsy

sites was seen and the pyloric channel was confirmed to be nonobstructive and visualized

portion of the duodenum was unremarkable.  At this point, the scope was then withdrawn and

the procedure then concluded.  The patient was taken to the recovery room in satisfactory

condition.

 

PLAN:  Plan will be to see the patient back next week along with her mother to discuss

treatment options.  The patient really at this point has gastroparesis that is refractory to

medical management.  This may be related to some of her psychiatric medications as she does

not have an overt diagnosis of diabetes, but at this point, the medical management appeared

to be failed __________ they have been trying to maintain an anti-bezoar type diet in

addition to the Zithromax.  We will see the patient back next week to discuss treatment

options.

 

 

Siddharth David MD

DD:  11/02/2020 17:46:37

DT:  11/03/2020 14:43:54

Job #:  [id number]

## 2021-10-15 ENCOUNTER — HOSPITAL ENCOUNTER (EMERGENCY)
Dept: HOSPITAL 11 - JP.ED | Age: 26
Discharge: HOME | End: 2021-10-15
Payer: MEDICARE

## 2021-10-15 VITALS — SYSTOLIC BLOOD PRESSURE: 129 MMHG | HEART RATE: 113 BPM | DIASTOLIC BLOOD PRESSURE: 100 MMHG

## 2021-10-15 DIAGNOSIS — E66.9: ICD-10-CM

## 2021-10-15 DIAGNOSIS — Z88.8: ICD-10-CM

## 2021-10-15 DIAGNOSIS — Z72.0: ICD-10-CM

## 2021-10-15 DIAGNOSIS — Z79.899: ICD-10-CM

## 2021-10-15 DIAGNOSIS — F20.1: Primary | ICD-10-CM

## 2021-10-15 NOTE — EDM.PDOCBH
ED HPI GENERAL MEDICAL PROBLEM





- General


Chief Complaint: Behavioral/Psych


Stated Complaint: MEDICAL VIA NORTH


Time Seen by Provider: 10/15/21 10:59


Source of Information: Reports: Patient, Family, Old Records, RN Notes Reviewed


History Limitations: Reports: No Limitations





- History of Present Illness


INITIAL COMMENTS - FREE TEXT/NARRATIVE: 





26-year-old female presents to the emergency department for mental health.  She 

has known history of schizophrenia is taking clozapine however per report from 

her mental health provider and mom she is intermittently taking this medication 

mom states that she has had decreased oral intake has not taken food some water 

has not showered for several weeks.  Has worked with her healthcare provider to 

get further treatment however family has declined thus far.  Decided to bring 

her to the emergency department via EMS services for evaluation denies 

depression denies any suicidal ideation homicidal ideation





- Related Data


                                    Allergies











Allergy/AdvReac Type Severity Reaction Status Date / Time


 


risperidone [From Risperdal] AdvReac  Delusions Verified 10/15/21 10:46











Home Meds: 


                                    Home Meds





cloZAPine 125 mg PO BEDTIME 08/14/19 [History]


Pantoprazole Sodium [Protonix] 40 mg PO DAILY 03/03/20 [History]


Metoprolol Succinate 25 mg PO QPM 09/02/20 [History]


Azithromycin 125 mg PO DAILY 10/27/20 [History]











Past Medical History


Cardiovascular History: Reports: Other (See Below)


Other Cardiovascular History: high pulse rate-? r/t medications


Respiratory History: Reports: Bronchitis, Recurrent


Gastrointestinal History: Reports: Chronic Constipation, Chronic Diarrhea, 

Gastritis


OB/GYN History: Reports: Pregnancy, Other (See Below)


Other OB/GYN History: heavy bleeding and clotting


Psychiatric History: Reports: Addiction, Anxiety, Bipolar, Panic Attack, Psych 

Hospitalization(s), Schizophrenia


Endocrine/Metabolic History: Reports: Obesity/BMI 30+


Hematologic History: Reports: Other (See Below)


Other Hematologic History: large uterine clots frequently





- Infectious Disease History


Infectious Disease History: Reports: Chicken Pox





- Past Surgical History


Head Surgeries/Procedures: Reports: None


HEENT Surgical History: Reports: Oral Surgery


Other HEENT Surgeries/Procedures: tooth pulled


Cardiovascular Surgical History: Reports: None


Respiratory Surgical History: Reports: None


GI Surgical History: Reports: None


Endocrine Surgical History: Reports: None


Dermatological Surgical History: Reports: None





Social & Family History





- Family History


Family Medical History: No Pertinent Family History





- Tobacco Use


Tobacco Use Status *Q: Current Every Day Tobacco User


Years of Tobacco use: 10


Packs/Tins Daily: 0.5


Used Tobacco, but Quit: No





- Caffeine Use


Caffeine Use: Reports: Soda


Caffeine Use Comment: 2 bottles daily





- Recreational Drug Use


Recreational Drug Use: No





ED ROS GENERAL





- Review of Systems


Review Of Systems: See Below


Constitutional: Reports: No Symptoms


HEENT: Reports: No Symptoms


Respiratory: Reports: No Symptoms


Cardiovascular: Reports: No Symptoms


Endocrine: Reports: No Symptoms


GI/Abdominal: Reports: No Symptoms


: Reports: No Symptoms


Musculoskeletal: Reports: No Symptoms


Psychiatric: Reports: No Symptoms.  Denies: Depression, Homicidal Ideation, 

Suicidal Ideation





ED EXAM, BEHAVIORAL HEALTH





- Physical Exam


Exam: See Below


Text/Narrative:: 





Orientated to person place and time, appropriately dressed, poorly groomed, 

memory to recent and remote events intact, good attention and concentration, 

speech is of adequate rate tone and volume, good fund of knowledge, language is 

appropriate,  Mood and affect are depressed, no pressured thoughts, denies 

suicidal ideation, denies homicidal ideation, no hallucinations visual or 

auditory, poor judgment, poor insight


Exam Limited By: No Limitations


General Appearance: Alert, WD/WN, No Apparent Distress


Eye Exam: Bilateral Eye: Normal Inspection


Respiratory/Chest: No Respiratory Distress, Lungs Clear, Normal Breath Sounds, 

No Accessory Muscle Use, Chest Non-Tender


Cardiovascular: Regular Rate, Rhythm, No Murmur


GI/Abdominal: Soft, Non-Tender





COURSE, BEHAVIORAL HEALTH COMP





- Course


Vital Signs: 


                                Last Vital Signs











Temp  98.1 F   10/15/21 10:50


 


Pulse  113 H  10/15/21 10:50


 


Resp  17   10/15/21 10:50


 


BP  129/100 H  10/15/21 10:50


 


Pulse Ox  98   10/15/21 10:50











Orders, Labs, Meds: 


                               Active Orders 24 hr











 Category Date Time Status


 


 CULTURE URINE [RM] Urgent Lab  10/15/21 11:28 Received








                                Laboratory Tests











  10/15/21 10/15/21 10/15/21 Range/Units





  11:15 11:15 11:15 


 


WBC  4.5    (4.5-11.0)  K/uL


 


RBC  5.27    (3.30-5.50)  M/uL


 


Hgb  15.0    (12.0-15.0)  g/dL


 


Hct  43.9    (36.0-48.0)  %


 


MCV  83    (80-98)  fL


 


MCH  29    (27-31)  pg


 


MCHC  34    (32-36)  %


 


Plt Count  165    (150-400)  K/uL


 


Neut % (Auto)  49.8    (36-66)  %


 


Lymph % (Auto)  39.9    (24-44)  %


 


Mono % (Auto)  8.5 H    (2-6)  %


 


Eos % (Auto)  1.6 L    (2-4)  %


 


Baso % (Auto)  0.2    (0-1)  %


 


Sodium   140   (140-148)  mmol/L


 


Potassium   4.1   (3.6-5.2)  mmol/L


 


Chloride   102   (100-108)  mmol/L


 


Carbon Dioxide   24   (21-32)  mmol/L


 


Anion Gap   14.3 H   (5.0-14.0)  mmol/L


 


BUN   12   (7-18)  mg/dL


 


Creatinine   0.9   (0.6-1.0)  mg/dL


 


Est Cr Clr Drug Dosing   71.48   mL/min


 


Estimated GFR (MDRD)   > 60   (>60)  


 


Glucose   83   ()  mg/dL


 


Calcium   9.4   (8.5-10.1)  mg/dL


 


Total Bilirubin   0.7   (0.2-1.0)  mg/dL


 


AST   9 L   (15-37)  U/L


 


ALT   7 L   (12-78)  U/L


 


Alkaline Phosphatase   84   ()  U/L


 


Total Protein   6.9   (6.4-8.2)  g/dL


 


Albumin   4.3   (3.4-5.0)  g/dL


 


Globulin   2.6   (2.3-3.5)  g/dL


 


Albumin/Globulin Ratio   1.7   (1.2-2.2)  


 


TSH, Ultra Sensitive     (0.358-3.740)  uIU/mL


 


Urine Color     (YELLOW)  


 


Urine Appearance     (CLEAR)  


 


Urine pH     (5.0-8.0)  


 


Ur Specific Gravity     (1.008-1.030)  


 


Urine Protein     (NEGATIVE)  mg/dL


 


Urine Glucose (UA)     (NEGATIVE)  mg/dL


 


Urine Ketones     (NEGATIVE)  mg/dL


 


Urine Occult Blood     (NEGATIVE)  


 


Urine Nitrite     (NEGATIVE)  


 


Urine Bilirubin     (NEGATIVE)  


 


Urine Urobilinogen     (0.2-1.0)  EU/dL


 


Ur Leukocyte Esterase     (NEGATIVE)  


 


Urine RBC     (0-5)  


 


Urine WBC     (0-5)  


 


Ur Epithelial Cells     


 


Amorphous Sediment     


 


Urine Bacteria     


 


Urine Mucus     


 


Urine Opiates Screen     (NEGATIVE)  


 


Ur Oxycodone Screen     (NEGATIVE)  


 


Urine Methadone Screen     (NEGATIVE)  


 


Ur Propoxyphene Screen     (NEGATIVE)  


 


Ur Barbiturates Screen     (NEGATIVE)  


 


Ur Tricyclics Screen     (NEGATIVE)  


 


Ur Phencyclidine Scrn     (NEGATIVE)  


 


Ur Amphetamine Screen     (NEGATIVE)  


 


U Methamphetamines Scrn     (NEGATIVE)  


 


Urine MDMA Screen     (NEGATIVE)  


 


U Benzodiazepines Scrn     (NEGATIVE)  


 


U Cocaine Metab Screen     (NEGATIVE)  


 


U Marijuana (THC) Screen     (NEGATIVE)  


 


Ethyl Alcohol    < 3  mg/dL














  10/15/21 10/15/21 10/15/21 Range/Units





  11:15 11:28 11:28 


 


WBC     (4.5-11.0)  K/uL


 


RBC     (3.30-5.50)  M/uL


 


Hgb     (12.0-15.0)  g/dL


 


Hct     (36.0-48.0)  %


 


MCV     (80-98)  fL


 


MCH     (27-31)  pg


 


MCHC     (32-36)  %


 


Plt Count     (150-400)  K/uL


 


Neut % (Auto)     (36-66)  %


 


Lymph % (Auto)     (24-44)  %


 


Mono % (Auto)     (2-6)  %


 


Eos % (Auto)     (2-4)  %


 


Baso % (Auto)     (0-1)  %


 


Sodium     (140-148)  mmol/L


 


Potassium     (3.6-5.2)  mmol/L


 


Chloride     (100-108)  mmol/L


 


Carbon Dioxide     (21-32)  mmol/L


 


Anion Gap     (5.0-14.0)  mmol/L


 


BUN     (7-18)  mg/dL


 


Creatinine     (0.6-1.0)  mg/dL


 


Est Cr Clr Drug Dosing     mL/min


 


Estimated GFR (MDRD)     (>60)  


 


Glucose     ()  mg/dL


 


Calcium     (8.5-10.1)  mg/dL


 


Total Bilirubin     (0.2-1.0)  mg/dL


 


AST     (15-37)  U/L


 


ALT     (12-78)  U/L


 


Alkaline Phosphatase     ()  U/L


 


Total Protein     (6.4-8.2)  g/dL


 


Albumin     (3.4-5.0)  g/dL


 


Globulin     (2.3-3.5)  g/dL


 


Albumin/Globulin Ratio     (1.2-2.2)  


 


TSH, Ultra Sensitive  1.358    (0.358-3.740)  uIU/mL


 


Urine Color   Yellow   (YELLOW)  


 


Urine Appearance   Cloudy A   (CLEAR)  


 


Urine pH   5.5   (5.0-8.0)  


 


Ur Specific Gravity   >= 1.030   (1.008-1.030)  


 


Urine Protein   30 H   (NEGATIVE)  mg/dL


 


Urine Glucose (UA)   Negative   (NEGATIVE)  mg/dL


 


Urine Ketones   Negative   (NEGATIVE)  mg/dL


 


Urine Occult Blood   Negative   (NEGATIVE)  


 


Urine Nitrite   Negative   (NEGATIVE)  


 


Urine Bilirubin   Moderate H   (NEGATIVE)  


 


Urine Urobilinogen   0.2   (0.2-1.0)  EU/dL


 


Ur Leukocyte Esterase   Negative   (NEGATIVE)  


 


Urine RBC   0-5   (0-5)  


 


Urine WBC   10-20 H   (0-5)  


 


Ur Epithelial Cells   Many   


 


Amorphous Sediment   Few   


 


Urine Bacteria   Moderate   


 


Urine Mucus   Moderate   


 


Urine Opiates Screen    Negative  (NEGATIVE)  


 


Ur Oxycodone Screen    Negative  (NEGATIVE)  


 


Urine Methadone Screen    Negative  (NEGATIVE)  


 


Ur Propoxyphene Screen    Negative  (NEGATIVE)  


 


Ur Barbiturates Screen    Negative  (NEGATIVE)  


 


Ur Tricyclics Screen    Negative  (NEGATIVE)  


 


Ur Phencyclidine Scrn    Negative  (NEGATIVE)  


 


Ur Amphetamine Screen    Negative  (NEGATIVE)  


 


U Methamphetamines Scrn    Negative  (NEGATIVE)  


 


Urine MDMA Screen    Negative  (NEGATIVE)  


 


U Benzodiazepines Scrn    Negative  (NEGATIVE)  


 


U Cocaine Metab Screen    Negative  (NEGATIVE)  


 


U Marijuana (THC) Screen    Negative  (NEGATIVE)  


 


Ethyl Alcohol     mg/dL














Departure





- Departure


Time of Disposition: 14:21


Disposition: Home, Self-Care 01


Condition: Poor


Clinical Impression: 


Schizophrenia


Qualifiers:


 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 

Disorganized schizophrenia








- Discharge Information


Instructions:  Schizophrenia


Referrals: 


Cathi Jacobo PA-C [Primary Care Provider] - 


Forms:  ED Department Discharge


Additional Instructions: 


Home care nurse will do an evaluation tomorrow, [facility] will continue 

working on long-term placement plan, the urine culture is pending this should be

available in about 3 days if it is consistent with infection antibiotics will be

prescribed





Sepsis Event Note (ED)





- Evaluation


Sepsis Screening Result: No Definite Risk





- Focused Exam


Vital Signs: 


                                   Vital Signs











  Temp Pulse Resp BP Pulse Ox


 


 10/15/21 10:50  98.1 F  113 H  17  129/100 H  98


 


 10/15/21 10:45  98.1 F  113 H  17  129/100 H  98














- My Orders


Last 24 Hours: 


My Active Orders





10/15/21 11:28


CULTURE URINE [RM] Urgent 














- Assessment/Plan


Last 24 Hours: 


My Active Orders





10/15/21 11:28


CULTURE URINE [RM] Urgent 











Plan: 





Assessment





Acuity = acute





Site and laterality = schizophrenia





Etiology  = medical compliance





Manifestations = none





Location of injury =  Home





Lab values = CBC, CMP unremarkable, urinalysis does have 10-20 WBCs with pyuria 

and moderate amount of bacteria however specific gravity 1.03 consistent with 

intravascular volume repletion culture is pending





Plan


Discharge planning did help placement, [facility] is currently working on 

long-term placement for the schizophrenia to help with medical compliance and 

her mental health.  At this time I did not feel the need for medical hold and 

would have difficulty getting placement from the emergency department for 

noncompliance with schizophrenia.  Discharge planning was very helpful in 

arranging a home care nurse to check on her tomorrow and then appointment with 

on Monday, continue to work with long-term placement plan, urine cultures 

pending if this does not prove to be urinary tract infection will provide

















 This note was dictated using dragon voice recognition software please call with

 any questions on syntax or grammar.

## 2021-10-27 ENCOUNTER — HOSPITAL ENCOUNTER (EMERGENCY)
Dept: HOSPITAL 11 - JP.ED | Age: 26
LOS: 6 days | Discharge: HOME | End: 2021-11-02
Payer: MEDICARE

## 2021-10-27 DIAGNOSIS — E86.0: ICD-10-CM

## 2021-10-27 DIAGNOSIS — F20.1: Primary | ICD-10-CM

## 2021-10-27 DIAGNOSIS — Z20.822: ICD-10-CM

## 2021-10-27 DIAGNOSIS — E66.9: ICD-10-CM

## 2021-10-27 DIAGNOSIS — Z87.891: ICD-10-CM

## 2021-10-27 PROCEDURE — 80307 DRUG TEST PRSMV CHEM ANLYZR: CPT

## 2021-10-27 PROCEDURE — 81025 URINE PREGNANCY TEST: CPT

## 2021-10-27 PROCEDURE — 36415 COLL VENOUS BLD VENIPUNCTURE: CPT

## 2021-10-27 PROCEDURE — 80053 COMPREHEN METABOLIC PANEL: CPT

## 2021-10-27 PROCEDURE — 96366 THER/PROPH/DIAG IV INF ADDON: CPT

## 2021-10-27 PROCEDURE — U0002 COVID-19 LAB TEST NON-CDC: HCPCS

## 2021-10-27 PROCEDURE — 81001 URINALYSIS AUTO W/SCOPE: CPT

## 2021-10-27 PROCEDURE — 99285 EMERGENCY DEPT VISIT HI MDM: CPT

## 2021-10-27 PROCEDURE — 80305 DRUG TEST PRSMV DIR OPT OBS: CPT

## 2021-10-27 PROCEDURE — 85025 COMPLETE CBC W/AUTO DIFF WBC: CPT

## 2021-10-27 PROCEDURE — 84443 ASSAY THYROID STIM HORMONE: CPT

## 2021-10-27 PROCEDURE — 96372 THER/PROPH/DIAG INJ SC/IM: CPT

## 2021-10-27 PROCEDURE — 96365 THER/PROPH/DIAG IV INF INIT: CPT

## 2021-10-27 RX ADMIN — Medication PRN ML: at 20:09

## 2021-10-27 NOTE — EDM.PDOCBH
<Dreyer,Majo - Last Filed: 10/28/21 04:46>





ED HPI GENERAL MEDICAL PROBLEM





- General


Chief Complaint: Behavioral/Psych


Stated Complaint: DEHYDRATED,MENTAL HEALTH EVAL


Time Seen by Provider: 10/27/21 17:38





- Related Data


                                    Allergies











Allergy/AdvReac Type Severity Reaction Status Date / Time


 


risperidone [From Risperdal] AdvReac  Delusions Verified 10/27/21 16:45











Home Meds: 


                                    Home Meds





cloZAPine 125 mg PO BEDTIME 08/14/19 [History]


Pantoprazole Sodium [Protonix] 40 mg PO DAILY 03/03/20 [History]


OLANZapine [Olanzapine Odt] 5 mg PO BEDTIME #30 tab.rapdis 11/02/21 [Rx]











COURSE, BEHAVIORAL HEALTH COMP





- Course


Medical Clearance: 





10/27/21 19:09


report received from Dr Officer, ER at change of shift/transfer of care.  chart 

to be sent to [facility] unit for review.  Hoping for acceptance for 

medication management/adjustment to stabilize her chronic schitzophrenia.  This 

has been recommended by her outpatient psychiatrist as patient has been 

noncompliant with medications for unknown timeframe, not caring for self 

appropriately (not bathing/dishelved appearance, ?-able eating/drinking although

 labs do not indicated any acute concerns with dehydration but patient was given

 1L NS bolus as she states she was dehydrated and could not drink because of 

this).  Labs have been reviewed during change of shift discussion.  Patient is 

medically cleared at this time for psychiatric placement


Discharge vs Psych Eval/Treatment:: 





10/28/21 04:47


[facility] unit has accepted at this time, accepting physician is Dr Huston.





Departure





- Departure


Time of Disposition: 04:46


Disposition: Home, Self-Care 01


Clinical Impression: 


 Noncompliance with medication regimen, Mild dehydration





Schizophrenia


Qualifiers:


 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 

Disorganized schizophrenia








- Discharge Information


Prescriptions: 


OLANZapine [Olanzapine Odt] 5 mg PO BEDTIME #30 tab.rapdis


Instructions:  Managing Schizophrenia, Supporting Someone With Schizophrenia


Referrals: 


Cathi Jacobo PA-C [Primary Care Provider] - 


Forms:  ED Department Discharge


Care Plan Goals: 


Continue taking the olanzapine 1 tablet at bedtime to treat your schizophrenia. 

NOT TAKING A DOSE IS NOT AND OPTION!!  Follow-up with your primary provider as 

soon as possible.





<OfficerArian - Last Filed: 11/02/21 16:52>





ED HPI GENERAL MEDICAL PROBLEM





- General


Source of Information: Reports: Patient, Family, Old Records, RN Notes Reviewed


History Limitations: Reports: Physical Impairment





- History of Present Illness


INITIAL COMMENTS - FREE TEXT/NARRATIVE: 





26-year-old female presents emergency department today for further mental health

evaluation, she has known history of schizophrenia follows with mental health in

the clinic she has been on a decline for the last several weeks has had poor 

oral intake is very unkempt has refused to shower or get out of bed does admit 

to depression was evaluated by myself 2 weeks ago we tried to do an admission at

that time but were unsuccessful with placement caregiver decided to take her 

home and continue to work with outpatient services.  Unfortunately the county or

any outpatient services have been initiated.  Reviewing notes from both her 

primary care provider and her mental health provider feel that she needs 

hospitalization however they will continue to work on that.  None of this has 

come to fruition at this time.  When asked she denies any suicidal ideation any 

homicidal ideation she does admit to being depressed she has a very flat affect 

gets into repeated questions stating she needs to go to the emergency department

in [place] because she is dehydrated she is unable to eat or drink because

she is dehydrated.  Difficult to obtain any review of systems from her due to 

her current state.  She has declined significantly from the last time I 

interviewed her on the on 15 October


  ** 0


Pain Score (Numeric/FACES): 8





Past Medical History


Cardiovascular History: Reports: Other (See Below)


Other Cardiovascular History: high pulse rate-? r/t medications


Respiratory History: Reports: Bronchitis, Recurrent


Gastrointestinal History: Reports: Chronic Constipation, Chronic Diarrhea, 

Gastritis


OB/GYN History: Reports: Other (See Below)


Other OB/GYN History: heavy bleeding and clotting


Psychiatric History: Reports: Addiction, Anxiety, Bipolar, Panic Attack, Psych 

Hospitalization(s), Schizophrenia


Endocrine/Metabolic History: Reports: Obesity/BMI 30+


Hematologic History: Reports: Other (See Below)


Other Hematologic History: large uterine clots frequently





- Infectious Disease History


Infectious Disease History: Reports: Chicken Pox





- Past Surgical History


Head Surgeries/Procedures: Reports: None


HEENT Surgical History: Reports: Oral Surgery


Other HEENT Surgeries/Procedures: tooth pulled


Cardiovascular Surgical History: Reports: None


Respiratory Surgical History: Reports: None


GI Surgical History: Reports: None


Endocrine Surgical History: Reports: None


Dermatological Surgical History: Reports: None





Social & Family History





- Family History


Family Medical History: No Pertinent Family History





- Tobacco Use


Tobacco Use Status *Q: Former Tobacco User


Used Tobacco, but Quit: Yes


Month/Year Tobacco Last Used: 2011


Second Hand Smoke Exposure: No





- Caffeine Use


Caffeine Use: Reports: Soda


Other Caffeine Use: Sprite X1 large


Caffeine Use Comment: 2 bottles daily





- Recreational Drug Use


Recreational Drug Use: No





ED ROS GENERAL





- Review of Systems


Review Of Systems: Unable To Obtain


Reason Not Obtained: Psychotic state





ED EXAM, BEHAVIORAL HEALTH





- Physical Exam


Exam: See Below


Text/Narrative:: 





Orientated to person only is confused about place and time, appropriately 

dressed, poorly groomed, memory to recent and remote events limited, good 

attention and concentration, speech is of adequate reduced tone and volume, poor

fund of knowledge, language is appropriate,  Mood and affect are  depressed, no 

pressured thoughts, denies suicidal ideation, denies homicidal ideation, no 

hallucinations visual or auditory, poor judgment, poor insight


Exam Limited By: Physical Impairment


General Appearance: Alert, No Apparent Distress


Eye Exam: Bilateral Eye: Normal Inspection


Respiratory/Chest: No Respiratory Distress, Lungs Clear, Normal Breath Sounds, 

No Accessory Muscle Use, Chest Non-Tender


Cardiovascular: Regular Rate, Rhythm, No Murmur


GI/Abdominal: Soft, Non-Tender





COURSE, BEHAVIORAL HEALTH COMP





- Course


Re-Assessment/Re-Exam: 





[facility] declined admission felt this was more of medical problem 

rather than a psychiatric problem, we are going to attempt to do a psychiatric 

consult via telemedicine today.





Took over care at 7 AM, will continue with evaluation for failure to thrive 

consultations from PT OT and speech therapy have been ordered today patient 

remains stable vitally minimal oral intake


Medical Clearance: 





10/31/21 06:49


was observed eating and drinking daily





11/02/21 16:52


Continue to work on placement, nursing staff was able to get her in the shower 

today she brushed her teeth applied hygiene products.  Nursing staff has noticed

that given simple directions she will follow commands will eat her food does 

toilet training independently.  She now has been on IM Zyprexa for the last 

4days suspicious this may be starting to influence some of her behavior.  Did 

have a long discussion with her primary care mental health provider who still 

feels that inpatient psychiatric treatment would be best for her were then with 

long-term care.  We will continue to try and find placement for her.














Sepsis Event Note (ED)





- Evaluation


Sepsis Screening Result: No Definite Risk





<Gino Ocampo - Last Filed: 11/02/21 19:51>





COURSE, BEHAVIORAL HEALTH COMP





- Course


Vital Signs: 


                                Last Vital Signs











Temp  36.6 C   11/01/21 19:47


 


Pulse  96   11/01/21 19:47


 


Resp  16   11/01/21 19:47


 


BP  128/81   11/01/21 19:47


 


Pulse Ox  98   11/01/21 19:47











Orders, Labs, Meds: 


                                Medication Orders





Olanzapine (Olanzapine 10 Mg Vial)  5 mg IM Q24H [facility]


   Last Admin: 11/01/21 19:43  Dose: 5 mg


   Documented by: DALILA


   Admin: 10/31/21 21:14  Dose: 5 mg


   Documented by: COURTNEY


Sodium Chloride (Sodium Chloride 0.9% 10 Ml Syringe)  10 ml FLUSH ASDIRECTED PRN


   PRN Reason: Keep Vein Open


   Last Admin: 10/29/21 14:13  Dose: 10 ml


   Documented by: ОЛЕГ


   Admin: 10/27/21 20:09  Dose: 10 ml


   Documented by: FRANCISCO





                                Laboratory Tests











  10/27/21 10/27/21 10/27/21 Range/Units





  17:49 17:49 17:49 


 


WBC  4.7    (4.5-11.0)  K/uL


 


RBC  5.16    (3.30-5.50)  M/uL


 


Hgb  14.9    (12.0-15.0)  g/dL


 


Hct  43.3    (36.0-48.0)  %


 


MCV  84    (80-98)  fL


 


MCH  29    (27-31)  pg


 


MCHC  34    (32-36)  %


 


Plt Count  145 L    (150-400)  K/uL


 


Neut % (Auto)  56.0    (36-66)  %


 


Lymph % (Auto)  35.2    (24-44)  %


 


Mono % (Auto)  7.6 H    (2-6)  %


 


Eos % (Auto)  0.8 L    (2-4)  %


 


Baso % (Auto)  0.4    (0-1)  %


 


Sodium   140   (140-148)  mmol/L


 


Potassium   3.9   (3.6-5.2)  mmol/L


 


Chloride   101   (100-108)  mmol/L


 


Carbon Dioxide   24   (21-32)  mmol/L


 


Anion Gap   15.4 H   (5.0-14.0)  mmol/L


 


BUN   12   (7-18)  mg/dL


 


Creatinine   0.9   (0.6-1.0)  mg/dL


 


Est Cr Clr Drug Dosing   71.48   mL/min


 


Estimated GFR (MDRD)   > 60   (>60)  


 


Glucose   71 L   ()  mg/dL


 


Calcium   9.2   (8.5-10.1)  mg/dL


 


Total Bilirubin   0.9   (0.2-1.0)  mg/dL


 


AST   7 L   (15-37)  U/L


 


ALT   12   (12-78)  U/L


 


Alkaline Phosphatase   78   ()  U/L


 


Total Protein   7.0   (6.4-8.2)  g/dL


 


Albumin   4.3   (3.4-5.0)  g/dL


 


Globulin   2.7   (2.3-3.5)  g/dL


 


Albumin/Globulin Ratio   1.6   (1.2-2.2)  


 


TSH, Ultra Sensitive     (0.358-3.740)  uIU/mL


 


Urine Color     (YELLOW)  


 


Urine Appearance     (CLEAR)  


 


Urine pH     (5.0-8.0)  


 


Ur Specific Gravity     (1.008-1.030)  


 


Urine Protein     (NEGATIVE)  mg/dL


 


Urine Glucose (UA)     (NEGATIVE)  mg/dL


 


Urine Ketones     (NEGATIVE)  mg/dL


 


Urine Occult Blood     (NEGATIVE)  


 


Urine Nitrite     (NEGATIVE)  


 


Urine Bilirubin     (NEGATIVE)  


 


Urine Urobilinogen     (0.2-1.0)  EU/dL


 


Ur Leukocyte Esterase     (NEGATIVE)  


 


Urine RBC     (0-5)  


 


Urine WBC     (0-5)  


 


Ur Epithelial Cells     


 


Amorphous Sediment     


 


Urine Bacteria     


 


Urine Mucus     


 


Urine HCG, Qual     


 


Urine Opiates Screen     (NEGATIVE)  


 


Ur Oxycodone Screen     (NEGATIVE)  


 


Urine Methadone Screen     (NEGATIVE)  


 


Ur Propoxyphene Screen     (NEGATIVE)  


 


Ur Barbiturates Screen     (NEGATIVE)  


 


Ur Tricyclics Screen     (NEGATIVE)  


 


Ur Phencyclidine Scrn     (NEGATIVE)  


 


Ur Amphetamine Screen     (NEGATIVE)  


 


U Methamphetamines Scrn     (NEGATIVE)  


 


Urine MDMA Screen     (NEGATIVE)  


 


U Benzodiazepines Scrn     (NEGATIVE)  


 


U Cocaine Metab Screen     (NEGATIVE)  


 


U Marijuana (THC) Screen     (NEGATIVE)  


 


Ethyl Alcohol    < 3  mg/dL


 


SARS CoV-2 RNA Rapid SERGIO     














  10/27/21 10/27/21 10/27/21 Range/Units





  17:49 18:12 18:12 


 


WBC     (4.5-11.0)  K/uL


 


RBC     (3.30-5.50)  M/uL


 


Hgb     (12.0-15.0)  g/dL


 


Hct     (36.0-48.0)  %


 


MCV     (80-98)  fL


 


MCH     (27-31)  pg


 


MCHC     (32-36)  %


 


Plt Count     (150-400)  K/uL


 


Neut % (Auto)     (36-66)  %


 


Lymph % (Auto)     (24-44)  %


 


Mono % (Auto)     (2-6)  %


 


Eos % (Auto)     (2-4)  %


 


Baso % (Auto)     (0-1)  %


 


Sodium     (140-148)  mmol/L


 


Potassium     (3.6-5.2)  mmol/L


 


Chloride     (100-108)  mmol/L


 


Carbon Dioxide     (21-32)  mmol/L


 


Anion Gap     (5.0-14.0)  mmol/L


 


BUN     (7-18)  mg/dL


 


Creatinine     (0.6-1.0)  mg/dL


 


Est Cr Clr Drug Dosing     mL/min


 


Estimated GFR (MDRD)     (>60)  


 


Glucose     ()  mg/dL


 


Calcium     (8.5-10.1)  mg/dL


 


Total Bilirubin     (0.2-1.0)  mg/dL


 


AST     (15-37)  U/L


 


ALT     (12-78)  U/L


 


Alkaline Phosphatase     ()  U/L


 


Total Protein     (6.4-8.2)  g/dL


 


Albumin     (3.4-5.0)  g/dL


 


Globulin     (2.3-3.5)  g/dL


 


Albumin/Globulin Ratio     (1.2-2.2)  


 


TSH, Ultra Sensitive  1.358    (0.358-3.740)  uIU/mL


 


Urine Color   Yellow   (YELLOW)  


 


Urine Appearance   Cloudy A   (CLEAR)  


 


Urine pH   6.0   (5.0-8.0)  


 


Ur Specific Gravity   >= 1.030   (1.008-1.030)  


 


Urine Protein   30 H   (NEGATIVE)  mg/dL


 


Urine Glucose (UA)   Negative   (NEGATIVE)  mg/dL


 


Urine Ketones   80 H   (NEGATIVE)  mg/dL


 


Urine Occult Blood   Small H   (NEGATIVE)  


 


Urine Nitrite   Negative   (NEGATIVE)  


 


Urine Bilirubin   Large H   (NEGATIVE)  


 


Urine Urobilinogen   1.0   (0.2-1.0)  EU/dL


 


Ur Leukocyte Esterase   Negative   (NEGATIVE)  


 


Urine RBC   0-5   (0-5)  


 


Urine WBC   0-5   (0-5)  


 


Ur Epithelial Cells   Many   


 


Amorphous Sediment   Not seen   


 


Urine Bacteria   Moderate   


 


Urine Mucus   Many   


 


Urine HCG, Qual    Negative  


 


Urine Opiates Screen     (NEGATIVE)  


 


Ur Oxycodone Screen     (NEGATIVE)  


 


Urine Methadone Screen     (NEGATIVE)  


 


Ur Propoxyphene Screen     (NEGATIVE)  


 


Ur Barbiturates Screen     (NEGATIVE)  


 


Ur Tricyclics Screen     (NEGATIVE)  


 


Ur Phencyclidine Scrn     (NEGATIVE)  


 


Ur Amphetamine Screen     (NEGATIVE)  


 


U Methamphetamines Scrn     (NEGATIVE)  


 


Urine MDMA Screen     (NEGATIVE)  


 


U Benzodiazepines Scrn     (NEGATIVE)  


 


U Cocaine Metab Screen     (NEGATIVE)  


 


U Marijuana (THC) Screen     (NEGATIVE)  


 


Ethyl Alcohol     mg/dL


 


SARS CoV-2 RNA Rapid SERGIO     














  10/27/21 10/28/21 Range/Units





  18:12 07:13 


 


WBC    (4.5-11.0)  K/uL


 


RBC    (3.30-5.50)  M/uL


 


Hgb    (12.0-15.0)  g/dL


 


Hct    (36.0-48.0)  %


 


MCV    (80-98)  fL


 


MCH    (27-31)  pg


 


MCHC    (32-36)  %


 


Plt Count    (150-400)  K/uL


 


Neut % (Auto)    (36-66)  %


 


Lymph % (Auto)    (24-44)  %


 


Mono % (Auto)    (2-6)  %


 


Eos % (Auto)    (2-4)  %


 


Baso % (Auto)    (0-1)  %


 


Sodium    (140-148)  mmol/L


 


Potassium    (3.6-5.2)  mmol/L


 


Chloride    (100-108)  mmol/L


 


Carbon Dioxide    (21-32)  mmol/L


 


Anion Gap    (5.0-14.0)  mmol/L


 


BUN    (7-18)  mg/dL


 


Creatinine    (0.6-1.0)  mg/dL


 


Est Cr Clr Drug Dosing    mL/min


 


Estimated GFR (MDRD)    (>60)  


 


Glucose    ()  mg/dL


 


Calcium    (8.5-10.1)  mg/dL


 


Total Bilirubin    (0.2-1.0)  mg/dL


 


AST    (15-37)  U/L


 


ALT    (12-78)  U/L


 


Alkaline Phosphatase    ()  U/L


 


Total Protein    (6.4-8.2)  g/dL


 


Albumin    (3.4-5.0)  g/dL


 


Globulin    (2.3-3.5)  g/dL


 


Albumin/Globulin Ratio    (1.2-2.2)  


 


TSH, Ultra Sensitive    (0.358-3.740)  uIU/mL


 


Urine Color    (YELLOW)  


 


Urine Appearance    (CLEAR)  


 


Urine pH    (5.0-8.0)  


 


Ur Specific Gravity    (1.008-1.030)  


 


Urine Protein    (NEGATIVE)  mg/dL


 


Urine Glucose (UA)    (NEGATIVE)  mg/dL


 


Urine Ketones    (NEGATIVE)  mg/dL


 


Urine Occult Blood    (NEGATIVE)  


 


Urine Nitrite    (NEGATIVE)  


 


Urine Bilirubin    (NEGATIVE)  


 


Urine Urobilinogen    (0.2-1.0)  EU/dL


 


Ur Leukocyte Esterase    (NEGATIVE)  


 


Urine RBC    (0-5)  


 


Urine WBC    (0-5)  


 


Ur Epithelial Cells    


 


Amorphous Sediment    


 


Urine Bacteria    


 


Urine Mucus    


 


Urine HCG, Qual    


 


Urine Opiates Screen  Negative   (NEGATIVE)  


 


Ur Oxycodone Screen  Negative   (NEGATIVE)  


 


Urine Methadone Screen  Negative   (NEGATIVE)  


 


Ur Propoxyphene Screen  Negative   (NEGATIVE)  


 


Ur Barbiturates Screen  Negative   (NEGATIVE)  


 


Ur Tricyclics Screen  Negative   (NEGATIVE)  


 


Ur Phencyclidine Scrn  Negative   (NEGATIVE)  


 


Ur Amphetamine Screen  Negative   (NEGATIVE)  


 


U Methamphetamines Scrn  Negative   (NEGATIVE)  


 


Urine MDMA Screen  Negative   (NEGATIVE)  


 


U Benzodiazepines Scrn  Negative   (NEGATIVE)  


 


U Cocaine Metab Screen  Negative   (NEGATIVE)  


 


U Marijuana (THC) Screen  Negative   (NEGATIVE)  


 


Ethyl Alcohol    mg/dL


 


SARS CoV-2 RNA Rapid SERGIO   Negative  








Medications











Generic Name Dose Route Start Last Admin





  Trade Name Freq  PRN Reason Stop Dose Admin


 


Olanzapine  5 mg  10/31/21 20:00  11/01/21 19:43





  Olanzapine 10 Mg Vial  IM   5 mg





  Q24H TRACEY   Administration


 


Sodium Chloride  10 ml  10/27/21 17:36  10/29/21 14:13





  Sodium Chloride 0.9% 10 Ml Syringe  FLUSH   10 ml





  ASDIRECTED PRN   Administration





  Keep Vein Open  














Discontinued Medications














Generic Name Dose Route Start Last Admin





  Trade Name Freq  PRN Reason Stop Dose Admin


 


Lactated Ringer's  1,000 mls @ 999 mls/hr  10/27/21 17:45  10/27/21 20:08





  Ringers, Lactated  IV   999 mls/hr





  .BOLUS TRACEY   Administration


 


Multivitamins/Minerals 10 ml/  1,016.2 mls @ 500 mls/hr  10/29/21 13:28  

10/29/21 14:13





Thiamine HCl 200 mg/ Folic  IV  10/29/21 15:29  500 mls/hr





Acid 1 mg/ Magnesium Sulfate 2  ONETIME ONE   Administration





gm/ Dextrose/Lactated Ringer'   





s   


 


Olanzapine  5 mg  10/29/21 19:48  10/29/21 20:03





  Olanzapine 10 Mg Vial  IM  10/29/21 19:49  5 mg





  ONETIME ONE   Administration


 


Olanzapine  5 mg  10/30/21 20:00 





  Olanzapine 10 Mg Vial  IM  10/30/21 20:01 





  ONETIME ONE  


 


Olanzapine  5 mg  10/30/21 20:00  10/30/21 20:52





  Olanzapine 10 Mg Vial  IM   5 mg





  DAILY TRACEY   Administration


 


Olanzapine  5 mg  11/02/21 19:20  11/02/21 19:42





  Olanzapine 5 Mg Tab  PO  11/02/21 19:21  5 mg





  ONETIME ONE   Administration











Re-Assessment/Re-Exam: 


10/28/21 18:00 [Dr. Ocampo]  I am assuming care of the patient from Dr. Alonso.  At this time we are looking for inpatient placement to stabilize the 

patient with medication.  She is not currently on a hold, but is holdable due to

her mental health.  





10/28/21 23:40 [facility]  reviewed the notes from Dr. Espino again refuses

the patient stating that the patient has to be able to eat and drink before they

will consider her for admission.  We will continue to look for an appropriate 

inpatient bed.  The patient continues to not want to eat or drink.





10/29/21 07:00 Care of the patient transferred to Dr. Alonso.





10/29/21 18:00 [Dr. Ocampo]  I assumed care of the patient from Dr. Alonso.





10/29/21 19:25 the patient has now been here for 51 hours and is not on any 

medications to attempt to control her disorganized schizophrenia.  I reached out

to Dr. Villafuerte, psychiatrist with [facility] for a consult on what we can do to 

start to stabilize this patient.  She normally is on clozapine 25 mg at bed but 

has not taken that for nearly a month.  She has not taking anything by mouth 

including water or food so an IV was placed and she did receive IV hydration 

with a banana bag today.  Dr. Villafuerte recommends that we start Zyprexa 5 mg IM 

daily.  I did order the dose for today but unfortunately it will not allow me to

order this on a scheduled basis.  I will pass this information on to Dr. Tenorio 

will likely take over the patient at 0700 hours on 10/30/2021.  The patient has 

already undergone a failure to thrive work-up including PT/OT evaluation and 

will get a speech evaluation on Monday.  We will continue to look for placement 

in the meantime.  We did attempt to give the patient a shower today and 

initially she was amenable to it, however, she declined once she was up in the 

patient room to get the shower.





11/01/2021 18:00 [Dr. Ocampo]  I am again assuming care of the patient 

from Dr. Tenorio.  To date, no facility is willing to take the patient due to her

complexity.  She is now eating intermittently. She is also receiving Zyprexa 5 

mg IM daily.  





11/02/2021 0700 Care of the patient will be turned over to Dr. Alonso while 

awaiting disposition.





11/02/2021 18:00 [Dr. Ocampo]  I am resuming care of Maria A from Dr. Alonso.





11/02/2021 18:45 Maria A came to the nurses desk and asked to use the phone to 

call her mom.  They apparently had a discussion and mom is decided that she is 

going to come to [facility] home at this point.  As there is a very low likelihood 

of her being placed anywhere and Maria A is not suicidal or threat to herself, I 

do believe that she is suitable of going home with mom.  We will continue on her

olanzapine 5 mg at bedtime.  I have provided a prescription for this and we will

give her her nighttime dose before she leaves.





Departure





- Departure


Time of Disposition: 19:51

## 2021-10-28 NOTE — CONS
DATE OF SERVICE:  10/28/2021

 

REFERRING PHYSICIAN:

 

CONSULTING PHYSICIAN:  Yamil Espino MD

 

Site where the services are provided is [facility] in [place], [place].  Site where the services are provided from our

offices in [place], [place].  Length of service for this 60-minute emergency room

telemedicine event is 60 minutes.

 

IDENTIFICATION:  The patient is a 26-year-old female who was brought to the emergency room

at [facility] in [place], [place], by her care provider.

She is seen for psychiatric consultation per the request of staff attending,  Officer,

and his treatment team.

 

CHIEF COMPLAINT:  "I was dehydrated."

 

HISTORY OF PRESENT ILLNESS:  The patient is a 26-year-old female who is brought in by a

guardian after the patient was having issues with increased __________, decreased appetite,

not caring for herself as well as possible depression.  The patient has history of

schizophrenia, and she is on a regimen of Clozaril 125 mg in the evening.  It is unclear if

patient has been taking this medication regularly.  She states that she has just been

feeling sick "for a while now," and she states she last took her medications "2 days ago."

She is stating that she is sleeping good and denies that she is suicidal or homicidal.  She

denies any hallucinations.  She denies any illicit substance use or excessive alcoholic use

complicating her clinical picture.  She does not feel that she is able to care for herself

and she feels "I need to be in the hospital."

 

MEDICATIONS:  At the time of presentation:

1. Clozapine 125 mg q.h.s.

2. Protonix 40 mg daily.

 

ALLERGIES:  RISPERDAL, WHICH CAUSES DELUSIONS.

 

 

PAST MEDICAL HISTORY:  The patient denies.

 

REVIEW OF SYSTEMS:  The patient denied any difficulties or acute complications with her GI,

, pulmonary, cardiac, endocrine, autoimmune, skin, musculoskeletal, or nervous systems.

 

FAMILY PSYCHATRIC AND CD HISTORY:  None reported.

 

PAST PSYCHIATRIC AND CD HISTORY:  The patient denies any previous psychiatric

hospitalizations or chemical dependency treatment.  She does have a history of

schizophrenia.  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 __________, and she lives with her

guardian who provides for her visits to her care providers.

 

SOCIAL HISTORY:  The patient is born and raised in [place], [place].  She is the

fourth of 4 siblings and 3 brothers.  She states her parents were  when she was 2

years age.  She was raised by her mom.  Mom worked as a .  The patient's higher

level of education is a high school diploma.  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

"musical artist."  She states she has been  x1 about 2 months, and she has 5

children.  Denies any abortions.  She currently lives in [place], she states with her

 and then her guardian, but she is unclear about this living situation, and she

states that her  is a college student.  She reports no  service or any legal

difficulties.

 

MENTAL STATUS EXAMINATION:  The patient is a 26-year-old soft spoken white female in no

apparent distress.  Speech is significant for increased latency of response and shortened

duration of utterance.  She is cognitively oriented x2 to person and place but not to date.

There are no abnormal motor movements or tics.  Her gait and station are not observed.  This

patient is seated during the course of the interview in an emergency room setting with staff

present.  Mood is "depressed."  Affect is cooperative but overall confused appearing.  There

is no behavioral or stated evidence of acute suicidal or homicidal ideation.  There is no

acute delusional symptoms available or evident; however, thought processes appear very

disorganized and there does appear to be quite a bit of thought blocking evident.  There are

no manic symptoms or loose associations evident.  Judgement and insight do appear impaired

secondary to patient's thought blocking and thought disorganization.  Motivation for help

appears fair.

 

VITAL SIGNS:  At the time of admission, 122/70, 94, 20, and 35.6 degrees Centigrade.

 

IMPRESSION:  Axis I:

1. Schizophrenia, F20.

2. Rule out schizoaffective disorder.

3. Depression, not otherwise specified, F32.9.

4. Rule out major depressive disorder.

Axis II:  None.

Axis III:  Reported history of dehydration but all laboratories are normal per staff report.

Axis IV:  Severe.

Axis V:  55.

 

PLAN:

1. Recommend that patient's medications __________ on her prescribed psychiatric

    medication regimen.

2. Recommend that when patient is medically stabilized in the ER and cleared from a

    medical standpoint that she be transferred to inpatient psych for psychiatric

    stabilization as she does not appear to be thinking clearly at this point in time.  We

    would recommend that upon transfer to inpatient psych unit that inpatient psych staff

    review patient's current psychiatric medication regimen and adjust accordingly if

    needed or make sure that patient is medication complaint, so she can be stabilized and

    discharged back to the community.

3. We will continue to follow up with patient on an as needed basis while she remains in

    emergency room unit.

4. We will follow up with patient sooner if there are any complications in the interim.

5. Crisis plan is in place.

 

 

 

 

Yamil Espino MD

DD:  10/28/2021 13:17:48

DT:  10/28/2021 15:21:39

Job #:  [id number]

## 2021-10-29 RX ADMIN — Medication PRN ML: at 14:13

## 2021-11-01 VITALS — HEART RATE: 96 BPM | SYSTOLIC BLOOD PRESSURE: 128 MMHG | DIASTOLIC BLOOD PRESSURE: 81 MMHG

## 2021-11-19 ENCOUNTER — HOSPITAL ENCOUNTER (EMERGENCY)
Dept: HOSPITAL 11 - JP.ED | Age: 26
LOS: 1 days | Discharge: HOME | End: 2021-11-20
Payer: MEDICARE

## 2021-11-19 DIAGNOSIS — Z87.891: ICD-10-CM

## 2021-11-19 DIAGNOSIS — Z20.822: ICD-10-CM

## 2021-11-19 DIAGNOSIS — E66.9: ICD-10-CM

## 2021-11-19 DIAGNOSIS — F31.9: ICD-10-CM

## 2021-11-19 DIAGNOSIS — Z88.8: ICD-10-CM

## 2021-11-19 DIAGNOSIS — F20.1: Primary | ICD-10-CM

## 2021-11-19 PROCEDURE — 85025 COMPLETE CBC W/AUTO DIFF WBC: CPT

## 2021-11-19 PROCEDURE — 80048 BASIC METABOLIC PNL TOTAL CA: CPT

## 2021-11-19 PROCEDURE — 81025 URINE PREGNANCY TEST: CPT

## 2021-11-19 PROCEDURE — U0002 COVID-19 LAB TEST NON-CDC: HCPCS

## 2021-11-19 PROCEDURE — 80305 DRUG TEST PRSMV DIR OPT OBS: CPT

## 2021-11-19 PROCEDURE — 81001 URINALYSIS AUTO W/SCOPE: CPT

## 2021-11-19 PROCEDURE — 99285 EMERGENCY DEPT VISIT HI MDM: CPT

## 2021-11-19 PROCEDURE — 36415 COLL VENOUS BLD VENIPUNCTURE: CPT

## 2021-11-19 NOTE — EDM.PDOCBH
<Noam Barrios - Last Filed: 11/20/21 00:38>





ED HPI GENERAL MEDICAL PROBLEM





- General


Chief Complaint: Behavioral/Psych


Stated Complaint: MEDICAL VIA NORTH


Time Seen by Provider: 11/19/21 22:30


Source of Information: Reports: EMS, Family


History Limitations: Reports: Altered Mental Status, Physical Impairment 

(Patient is acutely schizophrenic, uncooperative and only answers no to 

questions.)





- History of Present Illness


INITIAL COMMENTS - FREE TEXT/NARRATIVE: 





26-year-old female chronic schizophrenic who is not taking her medications, not 

caring for herself, showering or eating well.  Her mother is unable to get her 

to take her medications so called the ambulance [facility] to have her sent into 

the emergency room for stabilization.  She is not manic, she does not seem 

delusional but she does seem somewhat catatonic and disjointed from reality.  

She is not oriented.  She denies any pain, when I asked her why she is here she 

shakes her head no, if I asked her if she is having pain or shortness of breath 

or any symptoms she just shakes her head no but she does not seem to be 

registering really well what I am asking her.  Her mother is just simply not 

able to care for her or get her to comply with taking medication. Her last ED 

visit with her psychiatric provider the patient refused to go to the computer 

and be interviewed so her provider stopped her medications because she was not 

taking them anyway, and felt she needed to be placed for stabilization and 

consider long-term IM deposition type medications but she needed to be stabili

zed inpatient first. I am not sure what the delay in the last 2 weeks was but 

the patient has not had any medications, is not eating well and becoming more 

somnolent and withdrawn.


Onset: Unknown/Unsure (She was in the emergency room 3 weeks ago with very 

similar symptoms, was here for 3 days and stabilized with some treatment and was

sent home with her mother)


Associated Symptoms: Reports: Confusion (Seems disjointed from reality).  

Denies: Nausea/Vomiting, Shortness of Breath, Other





- Related Data


                                    Allergies











Allergy/AdvReac Type Severity Reaction Status Date / Time


 


risperidone [From Risperdal] AdvReac  Delusions Verified 11/19/21 22:28











Home Meds: 


                                    Home Meds





OLANZapine [Olanzapine Odt] 5 mg PO DAILY #30 tab.rapdis 11/20/21 [Rx]











Past Medical History


HEENT History: Reports: None


Cardiovascular History: Reports: Other (See Below)


Other Cardiovascular History: high pulse rate-? r/t medications


Respiratory History: Reports: Bronchitis, Recurrent


Gastrointestinal History: Reports: Chronic Constipation, Chronic Diarrhea, 

Gastritis


OB/GYN History: Reports: Other (See Below)


Other OB/GYN History: heavy bleeding and clotting


Psychiatric History: Reports: Addiction, Anxiety, Bipolar, Panic Attack, Psych 

Hospitalization(s), Schizophrenia


Endocrine/Metabolic History: Reports: Obesity/BMI 30+


Hematologic History: Reports: Other (See Below)


Other Hematologic History: large uterine clots frequently





- Infectious Disease History


Infectious Disease History: Reports: Chicken Pox





- Past Surgical History


Head Surgeries/Procedures: Reports: None


HEENT Surgical History: Reports: Oral Surgery


Other HEENT Surgeries/Procedures: tooth pulled


Cardiovascular Surgical History: Reports: None


Respiratory Surgical History: Reports: None


GI Surgical History: Reports: None


Endocrine Surgical History: Reports: None


Dermatological Surgical History: Reports: None





Social & Family History





- Family History


Family Medical History: No Pertinent Family History





- Tobacco Use


Tobacco Use Status *Q: Former Tobacco User


Used Tobacco, but Quit: Yes


Month/Year Tobacco Last Used: 2011





- Caffeine Use


Caffeine Use: Reports: Soda


Other Caffeine Use: Sprite X1 large


Caffeine Use Comment: 2 bottles daily





- Recreational Drug Use


Recreational Drug Use: No





ED ROS GENERAL





- Review of Systems


Review Of Systems: See Below


Reason Not Obtained: Difficult to obtain as the patient is not cooperative, not 

answering questions consistently





ED EXAM, BEHAVIORAL HEALTH





- Physical Exam


Exam: See Below


Exam Limited By: Physical Impairment (Depressed disengaged schizophrenic state)


General Appearance: Alert, No Apparent Distress


Eye Exam: Bilateral Eye: Normal Inspection (Pupils are reactive, no disconjugate

 gaze)


Head: Atraumatic


Respiratory/Chest: Lungs Clear


Cardiovascular: Regular Rate, Rhythm


GI/Abdominal: Soft, Non-Tender


Extremities: Other (No obvious injuries to the extremities)


Neurological: Alert.  No: Oriented x 3





COURSE, BEHAVIORAL HEALTH COMP





- Course


Re-Assessment/Re-Exam: 





CBC, BMP were obtained as well as a UA for urine pregnancy, urine drug screen 

and UA. A Covid test was done for placement. Hopefully we can get this patient 

placed so she can be stabilized and more effective treatment can be established.





CBC and CMP were obtained and are within normal limits.  Covid was negative.  

Awaiting urine for testing.





Departure





- Departure


Disposition: Home, Self-Care 01


Clinical Impression: 


 Bipolar 1 disorder, Noncompliance with medication regimen





Schizophrenia


Qualifiers:


 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 

Disorganized schizophrenia








- Discharge Information


Instructions:  Managing Schizophrenia, Supporting Someone With Schizophrenia


Referrals: 


Cathi Jacobo PA-C [Primary Care Provider] - 


Forms:  ED Department Discharge


Care Plan Goals: 


Follow-up on Monday morning with [facility] to see what can 

be put into place to ensure that Maria A continues to get supervised medical 

management and remains compliant on her medications. I did file a report with 

the state through the department of health requesting them to investigate if she

is a vulnerable adult and requires a more structured, supervised environment 

than at home. In the meantime, I have printed another prescription for Zyprexa 5

mg daily. Please make sure she takes it every day without excuse. No exceptions.





Sepsis Event Note (ED)





- Evaluation


Sepsis Screening Result: No Definite Risk





<Gino Ocampo C - Last Filed: 11/20/21 12:25>





COURSE, BEHAVIORAL HEALTH COMP





- Course


Vital Signs: 


                                Last Vital Signs











Temp  36.1 C   11/20/21 09:07


 


Pulse  70   11/20/21 09:07


 


Resp  16   11/20/21 09:07


 


BP  106/71   11/20/21 09:07


 


Pulse Ox  98   11/20/21 09:07











Orders, Labs, Meds: 


                               Active Orders 24 hr











 Category Date Time Status


 


 OLANZapine [ZyPREXA] Med  11/20/21 09:00 Active





 5 mg PO DAILY   








                                Medication Orders





Olanzapine (Olanzapine 5 Mg Tab)  5 mg PO DAILY [facility]


   Last Admin: 11/20/21 08:10  Dose: 5 mg


   Documented by: LILIA





                                Laboratory Tests











  11/19/21 11/19/21 11/19/21 Range/Units





  22:50 22:50 22:57 


 


WBC  7.0    (4.5-11.0)  K/uL


 


RBC  4.65    (3.30-5.50)  M/uL


 


Hgb  13.5    (12.0-15.0)  g/dL


 


Hct  40.2    (36.0-48.0)  %


 


MCV  87    (80-98)  fL


 


MCH  29    (27-31)  pg


 


MCHC  34    (32-36)  %


 


Plt Count  131 L    (150-400)  K/uL


 


Neut % (Auto)  45.6    (36-66)  %


 


Lymph % (Auto)  43.6    (24-44)  %


 


Mono % (Auto)  9.1 H    (2-6)  %


 


Eos % (Auto)  1.4 L    (2-4)  %


 


Baso % (Auto)  0.3    (0-1)  %


 


Sodium   142   (140-148)  mmol/L


 


Potassium   4.3   (3.6-5.2)  mmol/L


 


Chloride   105   (100-108)  mmol/L


 


Carbon Dioxide   25   (21-32)  mmol/L


 


Anion Gap   11.9   (5.0-14.0)  mmol/L


 


BUN   8   (7-18)  mg/dL


 


Creatinine   0.8   (0.6-1.0)  mg/dL


 


Est Cr Clr Drug Dosing   80.50   mL/min


 


Estimated GFR (MDRD)   > 60   (>60)  


 


Glucose   92   ()  mg/dL


 


Calcium   8.6   (8.5-10.1)  mg/dL


 


Urine Color     (YELLOW)  


 


Urine Appearance     (CLEAR)  


 


Urine pH     (5.0-8.0)  


 


Ur Specific Gravity     (1.008-1.030)  


 


Urine Protein     (NEGATIVE)  mg/dL


 


Urine Glucose (UA)     (NEGATIVE)  mg/dL


 


Urine Ketones     (NEGATIVE)  mg/dL


 


Urine Occult Blood     (NEGATIVE)  


 


Urine Nitrite     (NEGATIVE)  


 


Urine Bilirubin     (NEGATIVE)  


 


Urine Urobilinogen     (0.2-1.0)  EU/dL


 


Ur Leukocyte Esterase     (NEGATIVE)  


 


Urine RBC     (0-5)  


 


Urine WBC     (0-5)  


 


Ur Epithelial Cells     


 


Amorphous Sediment     


 


Urine Bacteria     


 


Urine Mucus     


 


Urine HCG, Qual     


 


Urine Opiates Screen     (NEGATIVE)  


 


Ur Oxycodone Screen     (NEGATIVE)  


 


Urine Methadone Screen     (NEGATIVE)  


 


Ur Propoxyphene Screen     (NEGATIVE)  


 


Ur Barbiturates Screen     (NEGATIVE)  


 


Ur Tricyclics Screen     (NEGATIVE)  


 


Ur Phencyclidine Scrn     (NEGATIVE)  


 


Ur Amphetamine Screen     (NEGATIVE)  


 


U Methamphetamines Scrn     (NEGATIVE)  


 


Urine MDMA Screen     (NEGATIVE)  


 


U Benzodiazepines Scrn     (NEGATIVE)  


 


U Cocaine Metab Screen     (NEGATIVE)  


 


U Marijuana (THC) Screen     (NEGATIVE)  


 


SARS CoV-2 RNA Rapid SERGIO    Negative  














  11/20/21 11/20/21 11/20/21 Range/Units





  06:57 06:57 06:57 


 


WBC     (4.5-11.0)  K/uL


 


RBC     (3.30-5.50)  M/uL


 


Hgb     (12.0-15.0)  g/dL


 


Hct     (36.0-48.0)  %


 


MCV     (80-98)  fL


 


MCH     (27-31)  pg


 


MCHC     (32-36)  %


 


Plt Count     (150-400)  K/uL


 


Neut % (Auto)     (36-66)  %


 


Lymph % (Auto)     (24-44)  %


 


Mono % (Auto)     (2-6)  %


 


Eos % (Auto)     (2-4)  %


 


Baso % (Auto)     (0-1)  %


 


Sodium     (140-148)  mmol/L


 


Potassium     (3.6-5.2)  mmol/L


 


Chloride     (100-108)  mmol/L


 


Carbon Dioxide     (21-32)  mmol/L


 


Anion Gap     (5.0-14.0)  mmol/L


 


BUN     (7-18)  mg/dL


 


Creatinine     (0.6-1.0)  mg/dL


 


Est Cr Clr Drug Dosing     mL/min


 


Estimated GFR (MDRD)     (>60)  


 


Glucose     ()  mg/dL


 


Calcium     (8.5-10.1)  mg/dL


 


Urine Color  Yellow    (YELLOW)  


 


Urine Appearance  Clear    (CLEAR)  


 


Urine pH  7.0    (5.0-8.0)  


 


Ur Specific Gravity  1.015    (1.008-1.030)  


 


Urine Protein  Negative    (NEGATIVE)  mg/dL


 


Urine Glucose (UA)  Negative    (NEGATIVE)  mg/dL


 


Urine Ketones  Negative    (NEGATIVE)  mg/dL


 


Urine Occult Blood  Negative    (NEGATIVE)  


 


Urine Nitrite  Negative    (NEGATIVE)  


 


Urine Bilirubin  Negative    (NEGATIVE)  


 


Urine Urobilinogen  0.2    (0.2-1.0)  EU/dL


 


Ur Leukocyte Esterase  Negative    (NEGATIVE)  


 


Urine RBC  Not seen    (0-5)  


 


Urine WBC  Not seen    (0-5)  


 


Ur Epithelial Cells  Few    


 


Amorphous Sediment  Not seen    


 


Urine Bacteria  Rare    


 


Urine Mucus  Not seen    


 


Urine HCG, Qual   Negative   


 


Urine Opiates Screen    Negative  (NEGATIVE)  


 


Ur Oxycodone Screen    Negative  (NEGATIVE)  


 


Urine Methadone Screen    Negative  (NEGATIVE)  


 


Ur Propoxyphene Screen    Negative  (NEGATIVE)  


 


Ur Barbiturates Screen    Negative  (NEGATIVE)  


 


Ur Tricyclics Screen    Negative  (NEGATIVE)  


 


Ur Phencyclidine Scrn    Negative  (NEGATIVE)  


 


Ur Amphetamine Screen    Negative  (NEGATIVE)  


 


U Methamphetamines Scrn    Negative  (NEGATIVE)  


 


Urine MDMA Screen    Negative  (NEGATIVE)  


 


U Benzodiazepines Scrn    Negative  (NEGATIVE)  


 


U Cocaine Metab Screen    Negative  (NEGATIVE)  


 


U Marijuana (THC) Screen    Negative  (NEGATIVE)  


 


SARS CoV-2 RNA Rapid SERGIO     








Medications











Generic Name Dose Route Start Last Admin





  Trade Name Raymond  PRN Reason Stop Dose Admin


 


Olanzapine  5 mg  11/20/21 09:00  11/20/21 08:10





  Olanzapine 5 Mg Tab  PO   5 mg





  DAILY TRACEY   Administration











Re-Assessment/Re-Exam: 


11/20/21 07:00 [Dr. Ocampo]  I am doing care of the patient at 0700 

hrs.  At this time we are awaiting the urinalysis and urine drug screen.  The 

patient has not been compliant with her medication regime for the last 2 weeks 

so we will reinitiate her Zyprexa 5 mg daily.  If she is not able to take it 

orally we will give it to her IM but not taking it is not an option for her.





11/20/21 07:10 urinalysis and urine drug screen were reviewed and are both 

negative.  Patient is medically cleared for inpatient admission at this time.





Medical Clearance: 





11/20/21 07:19 Maria A is in need of inpatient admission and stabilization for 

uncontrolled schizophrenia and medication noncompliance.  After evaluation and 

laboratories were obtained, patient is medically cleared for admission to 

inpatient psychiatry.





11/20/21 12:21 trying to contact numerous facilities on our checklist there were

no beds available that would accept the patient. [facility] did not feel 

that she was acute enough to require inpatient hospitalization and she was not 

suicidal and [facility] would not accept her because a did not feel she was sick 

enough to require hospitalization. This was discussed with the patient's mother 

Nadine who is going to come take her home. I did file a report for vulnerable 

adult with the state to try to get  to intervene and get her 

placed into a supervised facility where they can manage her medications and 

mental health. This was discussed with the patient's mother as well.








Departure





- Departure


Time of Disposition: 12:25





Sepsis Event Note (ED)





- Focused Exam


Vital Signs: 


                                   Vital Signs











  Temp Pulse Resp BP Pulse Ox


 


 11/20/21 09:07  36.1 C  70  16  106/71  98














- Problem List & Annotations


(1) Noncompliance with medication regimen


SNOMED Code(s): 908117729


   Code(s): Z91.14 - PATIENT'S OTHER NONCOMPLIANCE WITH MEDICATION REGIMEN   

Status: Acute   Priority: High   Current Visit: Yes   





(2) Schizophrenia


SNOMED Code(s): 65621094


   Code(s): F20.9 - SCHIZOPHRENIA, UNSPECIFIED   Status: Chronic   Priority: 

High   Current Visit: Yes   


Qualifiers: 


   Schizophrenia type: disorganized schizophrenia   Qualified Code(s): F20.1 - 

Disorganized schizophrenia   





(3) Bipolar 1 disorder


SNOMED Code(s): 248243806


   Code(s): F31.9 - BIPOLAR DISORDER, UNSPECIFIED   Status: Chronic   Priority: 

High   Current Visit: Yes   





- Problem List Review


Problem List Initiated/Reviewed/Updated: Yes





- My Orders


Last 24 Hours: 


My Active Orders





11/20/21 09:00


OLANZapine [ZyPREXA]   5 mg PO DAILY 














- Assessment/Plan


Last 24 Hours: 


My Active Orders





11/20/21 09:00


OLANZapine [ZyPREXA]   5 mg PO DAILY

## 2021-11-20 VITALS — DIASTOLIC BLOOD PRESSURE: 71 MMHG | HEART RATE: 70 BPM | SYSTOLIC BLOOD PRESSURE: 106 MMHG

## 2021-11-27 ENCOUNTER — HOSPITAL ENCOUNTER (EMERGENCY)
Dept: HOSPITAL 11 - JP.ED | Age: 26
Discharge: LEFT BEFORE DISCHARGE | End: 2021-11-27
Payer: MEDICARE

## 2021-11-27 VITALS — DIASTOLIC BLOOD PRESSURE: 90 MMHG | HEART RATE: 72 BPM | SYSTOLIC BLOOD PRESSURE: 149 MMHG

## 2021-11-27 DIAGNOSIS — Z88.8: ICD-10-CM

## 2021-11-27 DIAGNOSIS — E66.9: ICD-10-CM

## 2021-11-27 DIAGNOSIS — T19.2XXA: Primary | ICD-10-CM

## 2021-11-27 NOTE — EDM.PDOC
ED HPI GENERAL MEDICAL PROBLEM





- General


Chief Complaint: OB/GYN Problem


Stated Complaint: MEDICAL


Time Seen by Provider: 11/27/21 15:30


Source of Information: Reports: Patient, Family, RN Notes Reviewed


History Limitations: Reports: Physical Impairment





- History of Present Illness


INITIAL COMMENTS - FREE TEXT/NARRATIVE: 





26-year-old female known history of schizophrenia presents emergency department 

today stating "I have a pedophile in my vagina" after further questioning it 

sounds like she may have had a tampon retained in her vagina.  She is not 

forthcoming with information majority of this is obtained from her mom





- Related Data


                                    Allergies











Allergy/AdvReac Type Severity Reaction Status Date / Time


 


risperidone [From Risperdal] AdvReac  Delusions Verified 11/27/21 13:55











Home Meds: 


                                    Home Meds





NK [No Known Home Meds]  11/27/21 [History]











Past Medical History


HEENT History: Reports: None


Cardiovascular History: Reports: Other (See Below)


Other Cardiovascular History: high pulse rate-? r/t medications


Respiratory History: Reports: Bronchitis, Recurrent


Gastrointestinal History: Reports: Chronic Constipation, Chronic Diarrhea, 

Gastritis


OB/GYN History: Reports: Other (See Below)


Other OB/GYN History: heavy bleeding and clotting


Psychiatric History: Reports: Addiction, Anxiety, Bipolar, Panic Attack, Psych 

Hospitalization(s), Schizophrenia


Endocrine/Metabolic History: Reports: Obesity/BMI 30+


Hematologic History: Reports: Other (See Below)


Other Hematologic History: large uterine clots frequently





- Infectious Disease History


Infectious Disease History: Reports: Chicken Pox





- Past Surgical History


Head Surgeries/Procedures: Reports: None


HEENT Surgical History: Reports: Oral Surgery


Other HEENT Surgeries/Procedures: tooth pulled


Cardiovascular Surgical History: Reports: None


Respiratory Surgical History: Reports: None


GI Surgical History: Reports: None


Endocrine Surgical History: Reports: None


Dermatological Surgical History: Reports: None





Social & Family History





- Family History


Family Medical History: No Pertinent Family History





- Tobacco Use


Tobacco Use Status *Q: Never Tobacco User





- Caffeine Use


Caffeine Use: Reports: Soda


Other Caffeine Use: Sprite X1 large


Caffeine Use Comment: 2 bottles daily





- Recreational Drug Use


Recreational Drug Use: No





ED ROS GENERAL





- Review of Systems


Review Of Systems: Unable To Obtain


Reason Not Obtained: Due to schizophrenia difficult to obtain review of systems





ED EXAM, RENAL/





- Physical Exam


Exam: Not Obtained


Reason Not Obtained: Patient refused





Course





- Vital Signs


Last Recorded V/S: 





                                Last Vital Signs











Temp  97.8 F   11/27/21 13:53


 


Pulse  72   11/27/21 13:53


 


Resp  16   11/27/21 13:53


 


BP  149/90 H  11/27/21 13:53


 


Pulse Ox  99   11/27/21 13:53














- Re-Assessments/Exams


Free Text/Narrative Re-Assessment/Exam: 





11/27/21 16:27


Patient did not want a male doctor I offered her female doctor at shift change 

6:00 they left abruptly and eloped therefore undetermined no exam was done





Departure





- Departure


Time of Disposition: 16:27


Disposition: Eloped 07


Condition: Undetermined


Clinical Impression: 


Foreign body in vagina


Qualifiers:


 Encounter type: initial encounter Qualified Code(s): T19.2XXA - Foreign body in

 vulva and vagina, initial encounter








- Discharge Information


Referrals: 


PCP,None [Primary Care Provider] - 





Sepsis Event Note (ED)





- Evaluation


Sepsis Screening Result: No Definite Risk





- Focused Exam


Vital Signs: 





                                   Vital Signs











  Temp Pulse Resp BP Pulse Ox


 


 11/27/21 13:53  97.8 F  72  16  149/90 H  99

## 2021-12-11 ENCOUNTER — HOSPITAL ENCOUNTER (EMERGENCY)
Dept: HOSPITAL 11 - JP.ED | Age: 26
LOS: 2 days | Discharge: PSYCHIATRIC OR SUBSTANCE USE FACILITY | End: 2021-12-13
Payer: MEDICARE

## 2021-12-11 DIAGNOSIS — F20.1: Primary | ICD-10-CM

## 2021-12-11 DIAGNOSIS — Z88.8: ICD-10-CM

## 2021-12-11 DIAGNOSIS — E66.9: ICD-10-CM

## 2021-12-11 DIAGNOSIS — Z91.19: ICD-10-CM

## 2021-12-11 DIAGNOSIS — Z72.0: ICD-10-CM

## 2021-12-11 LAB — APAP SERPL-MCNC: 0 UG/ML (ref 10–30)

## 2021-12-11 PROCEDURE — 81001 URINALYSIS AUTO W/SCOPE: CPT

## 2021-12-11 PROCEDURE — U0002 COVID-19 LAB TEST NON-CDC: HCPCS

## 2021-12-11 PROCEDURE — 99284 EMERGENCY DEPT VISIT MOD MDM: CPT

## 2021-12-11 PROCEDURE — 80048 BASIC METABOLIC PNL TOTAL CA: CPT

## 2021-12-11 PROCEDURE — 85027 COMPLETE CBC AUTOMATED: CPT

## 2021-12-11 PROCEDURE — 80143 DRUG ASSAY ACETAMINOPHEN: CPT

## 2021-12-11 PROCEDURE — 80305 DRUG TEST PRSMV DIR OPT OBS: CPT

## 2021-12-11 PROCEDURE — 96372 THER/PROPH/DIAG INJ SC/IM: CPT

## 2021-12-11 PROCEDURE — 36415 COLL VENOUS BLD VENIPUNCTURE: CPT

## 2021-12-11 PROCEDURE — 81025 URINE PREGNANCY TEST: CPT

## 2021-12-11 PROCEDURE — 80307 DRUG TEST PRSMV CHEM ANLYZR: CPT

## 2021-12-11 PROCEDURE — 84443 ASSAY THYROID STIM HORMONE: CPT

## 2021-12-11 PROCEDURE — 80179 DRUG ASSAY SALICYLATE: CPT

## 2021-12-11 NOTE — EDM.PDOCBH
<Jung Tenorio G - Last Filed: 12/11/21 06:23>





ED HPI GENERAL MEDICAL PROBLEM





- General


Chief Complaint: Behavioral/Psych


Stated Complaint: EVAL


Time Seen by Provider: 12/11/21 03:00


Source of Information: Reports: Patient, Family, Old Records, RN


History Limitations: Reports: No Limitations





- History of Present Illness


INITIAL COMMENTS - FREE TEXT/NARRATIVE: 





25 yo female with a pHx of schizophrenia is brought in by her mother for mental 

illness exacerbated by Maria A's recent refusal to take her medication. Maria A 

has been living with her mother, but the mother reports tonight that she cannot 

handle her anymore and wants her placed. Was sent to [facility] in Oct 

of this year for similar issues. 


Onset: Gradual


Duration: Day(s):, Getting Worse


Location: Reports: Generalized


Quality: Reports: Other (pain not reported)


Severity: Severe (mental illness)


Improves with: Reports: None


Worsens with: Reports: Other (time, medication non-compliance)


Context: Reports: Other (See HPI)


Associated Symptoms: Reports: Other (behavioral)


Treatments PTA: Reports: Other (see below) (none)





- Related Data


                                    Allergies











Allergy/AdvReac Type Severity Reaction Status Date / Time


 


risperidone [From Risperdal] AdvReac  Delusions Verified 12/11/21 02:26











Home Meds: 


                                    Home Meds





OLANZapine [Olanzapine] 5 mg PO DAILY 12/11/21 [History]











Past Medical History


HEENT History: Reports: None


Cardiovascular History: Reports: Other (See Below)


Other Cardiovascular History: high pulse rate-? r/t medications


Respiratory History: Reports: Bronchitis, Recurrent


Gastrointestinal History: Reports: Chronic Constipation, Chronic Diarrhea, 

Gastritis


OB/GYN History: Reports: Other (See Below)


Other OB/GYN History: heavy bleeding and clotting


Psychiatric History: Reports: Addiction, Anxiety, Bipolar, Panic Attack, Psych 

Hospitalization(s), Schizophrenia


Endocrine/Metabolic History: Reports: Obesity/BMI 30+


Hematologic History: Reports: Other (See Below)


Other Hematologic History: large uterine clots frequently





- Infectious Disease History


Infectious Disease History: Reports: Chicken Pox





- Past Surgical History


Head Surgeries/Procedures: Reports: None


HEENT Surgical History: Reports: Oral Surgery


Other HEENT Surgeries/Procedures: tooth pulled


Cardiovascular Surgical History: Reports: None


Respiratory Surgical History: Reports: None


GI Surgical History: Reports: None


Endocrine Surgical History: Reports: None





Social & Family History





- Family History


Family Medical History: No Pertinent Family History





- Tobacco Use


Tobacco Use Status *Q: Current Every Day Tobacco User


Years of Tobacco use: 11


Packs/Tins Daily: 0.5





- Caffeine Use


Caffeine Use: Reports: Soda


Other Caffeine Use: Sprite X1 large


Caffeine Use Comment: 2 bottles daily





- Recreational Drug Use


Recreational Drug Use: No





ED ROS GENERAL





- Review of Systems


Review Of Systems: Unable To Obtain


Reason Not Obtained: mental illness





ED EXAM, BEHAVIORAL HEALTH





- Physical Exam


Exam: See Below


Exam Limited By: No Limitations


General Appearance: Alert, WD/WN, No Apparent Distress


Eye Exam: Bilateral Eye: Normal Inspection


Ears: Normal External Exam, Normal Canal, Hearing Grossly Normal


Nose: Normal Inspection, No Blood


Throat/Mouth: Normal Inspection, Normal Lips, Normal Voice, No Airway Compromise


Head: Atraumatic, Normocephalic


Neck: Normal Inspection


Respiratory/Chest: No Respiratory Distress, Lungs Clear, Normal Breath Sounds, 

No Accessory Muscle Use


Cardiovascular: Regular Rate, Rhythm, No Edema


GI/Abdominal: Non-Tender


Back Exam: Normal Inspection


Extremities: Normal Inspection, Non-Tender


Neurological: Alert, CN II-XII Intact, No Motor/Sensory Deficits


Psychiatric: Alert, Flat Affect


Skin Exam: Warm, Dry, Intact, Normal color, No rash





Departure





- Departure


Disposition: DC/Tfer to Psych Hosp[facility]


Condition: Fair


Clinical Impression: 


 Medical non-compliance





Schizophrenia


Qualifiers:


 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 

Disorganized schizophrenia








- Discharge Information


Referrals: 


Cathi Jacobo PA-C [Primary Care Provider] - 


Forms:  ED Department Discharge





<OfficerArian - Last Filed: 12/12/21 19:28>





COURSE, BEHAVIORAL HEALTH COMP





- Course


Vital Signs: 





                                Last Vital Signs











Temp  98.1 F   12/11/21 16:09


 


Pulse  88   12/11/21 16:09


 


Resp  16   12/11/21 16:09


 


BP  122/81   12/11/21 16:09


 


Pulse Ox  97   12/11/21 16:09











Orders, Labs, Meds: 





                                Laboratory Tests











  12/11/21 12/11/21 12/11/21 Range/Units





  04:41 04:41 04:41 


 


WBC  5.4    (4.5-11.0)  K/uL


 


RBC  4.70    (3.30-5.50)  M/uL


 


Hgb  13.5    (12.0-15.0)  g/dL


 


Hct  41.4    (36.0-48.0)  %


 


MCV  88    (80-98)  fL


 


MCH  29    (27-31)  pg


 


MCHC  33    (32-36)  %


 


Plt Count  135 L    (150-400)  K/uL


 


Sodium   138 L   (140-148)  mmol/L


 


Potassium   3.8   (3.6-5.2)  mmol/L


 


Chloride   103   (100-108)  mmol/L


 


Carbon Dioxide   26   (21-32)  mmol/L


 


Anion Gap   12.8   (5.0-14.0)  mmol/L


 


BUN   9   (7-18)  mg/dL


 


Creatinine   0.7   (0.6-1.0)  mg/dL


 


Est Cr Clr Drug Dosing   87.48   mL/min


 


Estimated GFR (MDRD)   > 60   (>60)  


 


Glucose   87   ()  mg/dL


 


Calcium   8.8   (8.5-10.1)  mg/dL


 


TSH, Ultra Sensitive    1.165  (0.358-3.740)  uIU/mL


 


Urine Color     (YELLOW)  


 


Urine Appearance     (CLEAR)  


 


Urine pH     (5.0-8.0)  


 


Ur Specific Gravity     (1.008-1.030)  


 


Urine Protein     (NEGATIVE)  mg/dL


 


Urine Glucose (UA)     (NEGATIVE)  mg/dL


 


Urine Ketones     (NEGATIVE)  mg/dL


 


Urine Occult Blood     (NEGATIVE)  


 


Urine Nitrite     (NEGATIVE)  


 


Urine Bilirubin     (NEGATIVE)  


 


Urine Urobilinogen     (0.2-1.0)  EU/dL


 


Ur Leukocyte Esterase     (NEGATIVE)  


 


Urine RBC     (0-5)  


 


Urine WBC     (0-5)  


 


Ur Epithelial Cells     


 


Amorphous Sediment     


 


Urine Bacteria     


 


Urine Mucus     


 


Urine HCG, Qual     


 


Salicylates     (2.0-20.0)  mg/dL


 


Urine Opiates Screen     (NEGATIVE)  


 


Ur Oxycodone Screen     (NEGATIVE)  


 


Urine Methadone Screen     (NEGATIVE)  


 


Ur Propoxyphene Screen     (NEGATIVE)  


 


Acetaminophen    0.0 L  (10.0-30.0)  ug/mL


 


Ur Barbiturates Screen     (NEGATIVE)  


 


Ur Tricyclics Screen     (NEGATIVE)  


 


Ur Phencyclidine Scrn     (NEGATIVE)  


 


Ur Amphetamine Screen     (NEGATIVE)  


 


U Methamphetamines Scrn     (NEGATIVE)  


 


Urine MDMA Screen     (NEGATIVE)  


 


U Benzodiazepines Scrn     (NEGATIVE)  


 


U Cocaine Metab Screen     (NEGATIVE)  


 


U Marijuana (THC) Screen     (NEGATIVE)  


 


Ethyl Alcohol     mg/dL


 


SARS CoV-2 RNA Rapid SERGIO     














  12/11/21 12/11/21 12/11/21 Range/Units





  04:41 04:41 04:49 


 


WBC     (4.5-11.0)  K/uL


 


RBC     (3.30-5.50)  M/uL


 


Hgb     (12.0-15.0)  g/dL


 


Hct     (36.0-48.0)  %


 


MCV     (80-98)  fL


 


MCH     (27-31)  pg


 


MCHC     (32-36)  %


 


Plt Count     (150-400)  K/uL


 


Sodium     (140-148)  mmol/L


 


Potassium     (3.6-5.2)  mmol/L


 


Chloride     (100-108)  mmol/L


 


Carbon Dioxide     (21-32)  mmol/L


 


Anion Gap     (5.0-14.0)  mmol/L


 


BUN     (7-18)  mg/dL


 


Creatinine     (0.6-1.0)  mg/dL


 


Est Cr Clr Drug Dosing     mL/min


 


Estimated GFR (MDRD)     (>60)  


 


Glucose     ()  mg/dL


 


Calcium     (8.5-10.1)  mg/dL


 


TSH, Ultra Sensitive     (0.358-3.740)  uIU/mL


 


Urine Color    Yellow  (YELLOW)  


 


Urine Appearance    Clear  (CLEAR)  


 


Urine pH    7.0  (5.0-8.0)  


 


Ur Specific Gravity    1.015  (1.008-1.030)  


 


Urine Protein    Negative  (NEGATIVE)  mg/dL


 


Urine Glucose (UA)    Negative  (NEGATIVE)  mg/dL


 


Urine Ketones    Negative  (NEGATIVE)  mg/dL


 


Urine Occult Blood    Negative  (NEGATIVE)  


 


Urine Nitrite    Negative  (NEGATIVE)  


 


Urine Bilirubin    Negative  (NEGATIVE)  


 


Urine Urobilinogen    0.2  (0.2-1.0)  EU/dL


 


Ur Leukocyte Esterase    Negative  (NEGATIVE)  


 


Urine RBC    0-5  (0-5)  


 


Urine WBC    0-5  (0-5)  


 


Ur Epithelial Cells    Rare  


 


Amorphous Sediment    Not seen  


 


Urine Bacteria    Few  


 


Urine Mucus    Not seen  


 


Urine HCG, Qual     


 


Salicylates  0.6 L    (2.0-20.0)  mg/dL


 


Urine Opiates Screen     (NEGATIVE)  


 


Ur Oxycodone Screen     (NEGATIVE)  


 


Urine Methadone Screen     (NEGATIVE)  


 


Ur Propoxyphene Screen     (NEGATIVE)  


 


Acetaminophen     (10.0-30.0)  ug/mL


 


Ur Barbiturates Screen     (NEGATIVE)  


 


Ur Tricyclics Screen     (NEGATIVE)  


 


Ur Phencyclidine Scrn     (NEGATIVE)  


 


Ur Amphetamine Screen     (NEGATIVE)  


 


U Methamphetamines Scrn     (NEGATIVE)  


 


Urine MDMA Screen     (NEGATIVE)  


 


U Benzodiazepines Scrn     (NEGATIVE)  


 


U Cocaine Metab Screen     (NEGATIVE)  


 


U Marijuana (THC) Screen     (NEGATIVE)  


 


Ethyl Alcohol   < 3   mg/dL


 


SARS CoV-2 RNA Rapid SERGIO     














  12/11/21 12/11/21 12/11/21 Range/Units





  04:49 04:49 04:50 


 


WBC     (4.5-11.0)  K/uL


 


RBC     (3.30-5.50)  M/uL


 


Hgb     (12.0-15.0)  g/dL


 


Hct     (36.0-48.0)  %


 


MCV     (80-98)  fL


 


MCH     (27-31)  pg


 


MCHC     (32-36)  %


 


Plt Count     (150-400)  K/uL


 


Sodium     (140-148)  mmol/L


 


Potassium     (3.6-5.2)  mmol/L


 


Chloride     (100-108)  mmol/L


 


Carbon Dioxide     (21-32)  mmol/L


 


Anion Gap     (5.0-14.0)  mmol/L


 


BUN     (7-18)  mg/dL


 


Creatinine     (0.6-1.0)  mg/dL


 


Est Cr Clr Drug Dosing     mL/min


 


Estimated GFR (MDRD)     (>60)  


 


Glucose     ()  mg/dL


 


Calcium     (8.5-10.1)  mg/dL


 


TSH, Ultra Sensitive     (0.358-3.740)  uIU/mL


 


Urine Color     (YELLOW)  


 


Urine Appearance     (CLEAR)  


 


Urine pH     (5.0-8.0)  


 


Ur Specific Gravity     (1.008-1.030)  


 


Urine Protein     (NEGATIVE)  mg/dL


 


Urine Glucose (UA)     (NEGATIVE)  mg/dL


 


Urine Ketones     (NEGATIVE)  mg/dL


 


Urine Occult Blood     (NEGATIVE)  


 


Urine Nitrite     (NEGATIVE)  


 


Urine Bilirubin     (NEGATIVE)  


 


Urine Urobilinogen     (0.2-1.0)  EU/dL


 


Ur Leukocyte Esterase     (NEGATIVE)  


 


Urine RBC     (0-5)  


 


Urine WBC     (0-5)  


 


Ur Epithelial Cells     


 


Amorphous Sediment     


 


Urine Bacteria     


 


Urine Mucus     


 


Urine HCG, Qual  Negative    


 


Salicylates     (2.0-20.0)  mg/dL


 


Urine Opiates Screen   Negative   (NEGATIVE)  


 


Ur Oxycodone Screen   Negative   (NEGATIVE)  


 


Urine Methadone Screen   Negative   (NEGATIVE)  


 


Ur Propoxyphene Screen   Negative   (NEGATIVE)  


 


Acetaminophen     (10.0-30.0)  ug/mL


 


Ur Barbiturates Screen   Negative   (NEGATIVE)  


 


Ur Tricyclics Screen   Negative   (NEGATIVE)  


 


Ur Phencyclidine Scrn   Negative   (NEGATIVE)  


 


Ur Amphetamine Screen   Negative   (NEGATIVE)  


 


U Methamphetamines Scrn   Negative   (NEGATIVE)  


 


Urine MDMA Screen   Negative   (NEGATIVE)  


 


U Benzodiazepines Scrn   Negative   (NEGATIVE)  


 


U Cocaine Metab Screen   Negative   (NEGATIVE)  


 


U Marijuana (THC) Screen   Negative   (NEGATIVE)  


 


Ethyl Alcohol     mg/dL


 


SARS CoV-2 RNA Rapid SERGIO    Negative  








Medications














Discontinued Medications














Generic Name Dose Route Start Last Admin





  Trade Name Freq  PRN Reason Stop Dose Admin


 


Olanzapine  10 mg  12/11/21 02:59  12/11/21 03:07





  Olanzapine 10 Mg Vial  IM  12/11/21 03:00  10 mg





  ONETIME ONE   Administration


 


Olanzapine  5 mg  12/12/21 07:47  12/12/21 07:54





  Olanzapine 5 Mg Tab  PO  12/12/21 07:48  5 mg





  ONETIME ONE   Administration














Departure





- Departure


Time of Disposition: 19:27





- Assessment/Plan


Plan: 





Assessment





Acuity = acute





Site and laterality = schizophrenia with medical compliance





Etiology  = unknown





Manifestations = none





Location of injury =  Home





Lab values = CBC, BMP, urinalysis, urine drug screen alcohol Covid all negative





Plan


Acceptance from Dr. Mcdowell psychiatrist at [facility] should be 

transported via EMS ground

















 This note was dictated using dragon voice recognition software please call with

any questions on syntax or grammar.

## 2021-12-12 VITALS — DIASTOLIC BLOOD PRESSURE: 84 MMHG | HEART RATE: 91 BPM | SYSTOLIC BLOOD PRESSURE: 119 MMHG
